# Patient Record
Sex: FEMALE | Race: WHITE | NOT HISPANIC OR LATINO | Employment: OTHER | URBAN - METROPOLITAN AREA
[De-identification: names, ages, dates, MRNs, and addresses within clinical notes are randomized per-mention and may not be internally consistent; named-entity substitution may affect disease eponyms.]

---

## 2023-05-17 DIAGNOSIS — Z00.00 HEALTH MAINTENANCE EXAMINATION: ICD-10-CM

## 2023-07-09 PROBLEM — I47.10 PSVT (PAROXYSMAL SUPRAVENTRICULAR TACHYCARDIA) (CMS-HCC): Status: ACTIVE | Noted: 2023-07-09

## 2023-07-09 PROBLEM — M54.30 SCIATICA: Status: RESOLVED | Noted: 2023-07-09 | Resolved: 2023-07-09

## 2023-07-09 PROBLEM — R73.02 IGT (IMPAIRED GLUCOSE TOLERANCE): Status: ACTIVE | Noted: 2023-07-09

## 2023-07-09 PROBLEM — I34.0 MITRAL REGURGITATION: Status: ACTIVE | Noted: 2023-07-09

## 2023-07-09 PROBLEM — E55.9 MILD VITAMIN D DEFICIENCY: Status: ACTIVE | Noted: 2023-07-09

## 2023-07-09 PROBLEM — K21.9 ESOPHAGEAL REFLUX: Status: ACTIVE | Noted: 2023-07-09

## 2023-07-09 PROBLEM — R31.9 HEMATURIA: Status: ACTIVE | Noted: 2023-07-09

## 2023-07-09 PROBLEM — I25.10 CORONARY ARTERY ARTERIOSCLEROSIS: Status: ACTIVE | Noted: 2023-07-09

## 2023-07-09 PROBLEM — E78.5 HYPERLIPIDEMIA: Status: ACTIVE | Noted: 2023-07-09

## 2023-07-09 PROBLEM — E21.3 HYPERPARATHYROIDISM (MULTI): Status: RESOLVED | Noted: 2023-07-09 | Resolved: 2023-07-09

## 2023-07-09 PROBLEM — G47.00 INSOMNIA: Status: ACTIVE | Noted: 2023-07-09

## 2023-07-09 PROBLEM — R09.89 BRUIT: Status: ACTIVE | Noted: 2023-07-09

## 2023-07-09 PROBLEM — J45.909 ASTHMA (HHS-HCC): Status: ACTIVE | Noted: 2023-07-09

## 2023-07-09 PROBLEM — M81.0 OSTEOPOROSIS: Status: ACTIVE | Noted: 2023-07-09

## 2023-07-09 PROBLEM — K59.09 CHRONIC CONSTIPATION: Status: ACTIVE | Noted: 2023-07-09

## 2023-07-09 PROBLEM — I10 HYPERTENSION: Status: ACTIVE | Noted: 2023-07-09

## 2023-07-09 RX ORDER — ROSUVASTATIN CALCIUM 20 MG/1
20 TABLET, COATED ORAL NIGHTLY
COMMUNITY
End: 2023-09-26 | Stop reason: SDUPTHER

## 2023-07-09 RX ORDER — BECLOMETHASONE DIPROPIONATE HFA 40 UG/1
AEROSOL, METERED RESPIRATORY (INHALATION)
COMMUNITY
Start: 2023-02-26 | End: 2023-09-19 | Stop reason: ALTCHOICE

## 2023-07-09 RX ORDER — MIRTAZAPINE 15 MG/1
15 TABLET, FILM COATED ORAL NIGHTLY
COMMUNITY
Start: 2023-04-02

## 2023-07-09 RX ORDER — ACETAMINOPHEN 500 MG
1 TABLET ORAL DAILY
COMMUNITY

## 2023-07-09 RX ORDER — METOPROLOL SUCCINATE 25 MG/1
25 TABLET, EXTENDED RELEASE ORAL DAILY
COMMUNITY
End: 2023-09-26 | Stop reason: SDUPTHER

## 2023-07-09 RX ORDER — DENOSUMAB 60 MG/ML
INJECTION SUBCUTANEOUS
COMMUNITY
Start: 2021-10-06

## 2023-07-09 RX ORDER — OLMESARTAN MEDOXOMIL 40 MG/1
40 TABLET ORAL DAILY
COMMUNITY
End: 2023-09-26 | Stop reason: SDUPTHER

## 2023-07-09 RX ORDER — AMLODIPINE BESYLATE 5 MG/1
5 TABLET ORAL DAILY
COMMUNITY
End: 2023-09-26 | Stop reason: SDUPTHER

## 2023-07-09 RX ORDER — EZETIMIBE 10 MG/1
10 TABLET ORAL DAILY
COMMUNITY
End: 2023-09-26 | Stop reason: SDUPTHER

## 2023-07-10 NOTE — PROGRESS NOTES
Valerie Owusu is a 82 y.o. female who presents New Patient Visit      HPI:   Valerie presents for a meet and greet an establish care visit.  She has a medical history for longstanding tachycardia since she has been 16 years old, an underdeveloped left breast with implants and approximately 11 years ago ended up in the intensive care unit after an implant replacement she got dehydrated with severe hyponatremia to 116.  She has preclinical coronary artery disease, HTN, dyslipidemia, osteoporosis, anxiety, And longstanding insomnia.  She does follow with psychiatry and has had different medications for sleep including mirtazapine and doxepin which made her tachycardia worse.  Currently she has tried over-the-counter CBD Gummies which has been very effective., .S/P parathyroidectomy, chronic microhematuria,     Stress testing 2018 normal stress echo, exercised to 11.5 mets, again no angina.     Microhematuria workup neg in the past w/CT urogram w/kidney cysts. Urine cytology neg, cystoscopy neg. Saw urology, neg investigation.     rheumatology, started her on Prolia for her osteoporosis hx.   Last bone density 2020, T score -2.5 in the femur. Treated w/bisphosphonate therapy prior.         Walks  a lot  800-25,000 steps a day.  High heat and humidity bothers her. Can get reactive airway and asthmatic bronchitis but not a true asthmatic.    No exercise intolerance.  Getting more frequent now.    Sometimes a flip flop, then she is fine.        Shx- to [facility] with 2 adult kids Dr. Micheal Owusu and Connie who is in town.  She has 5 biologic grandchildren and 2 grandchildren from Connie' second marriage as her 's children  She loves gardening.  Lives in .   Worked in special education. Good friends with Atul and Elif Arriaga.        Patient Active Problem List   Diagnosis    Asthma    Bruit    Chronic constipation    Coronary artery arteriosclerosis    Osteoporosis    Esophageal reflux    Hematuria    Hyperlipidemia     Hypertension    IGT (impaired glucose tolerance)    Insomnia    Mild vitamin D deficiency    Mitral regurgitation    PSVT (paroxysmal supraventricular tachycardia) (CMS/[facility])        Past Medical History:   Diagnosis Date    Age-related osteoporosis without current pathological fracture 10/06/2021    Osteoporosis    Hyperparathyroidism (CMS/HCC) 07/09/2023    Other disorders of intestinal carbohydrate absorption     Other disorders of intestinal carbohydrate absorption    Personal history of other diseases of the circulatory system     History of hypertension    Personal history of other diseases of the circulatory system 06/24/2013    History of paroxysmal supraventricular tachycardia    Personal history of other endocrine, nutritional and metabolic disease     History of hyperparathyroidism    Personal history of other endocrine, nutritional and metabolic disease     History of hyperlipidemia    Sciatica 07/09/2023    Vitamin D deficiency, unspecified     Vitamin D deficiency        Past Surgical History:   Procedure Laterality Date    OTHER SURGICAL HISTORY  10/06/2021    Breast Surgery Reconstruction    OTHER SURGICAL HISTORY  10/06/2021    Parathyroid Resection        Social History     Tobacco Use    Smoking status: Never    Smokeless tobacco: Never         Current Outpatient Medications:     amLODIPine (Norvasc) 5 mg tablet, Take 1 tablet (5 mg) by mouth once daily., Disp: , Rfl:     cholecalciferol (Vitamin D-3) 50 mcg (2,000 unit) capsule, Take 1 capsule (50 mcg) by mouth once daily., Disp: , Rfl:     denosumab (Prolia) 60 mg/mL syringe, Inject under the skin., Disp: , Rfl:     ezetimibe (Zetia) 10 mg tablet, Take 1 tablet (10 mg) by mouth once daily., Disp: , Rfl:     magnesium oxide (Mag-Ox) 400 mg tablet, Take 1.25 tablets (500 mg) by mouth 1 (one) time per week., Disp: 11 tablet, Rfl: 11    metoprolol succinate XL (Toprol-XL) 25 mg 24 hr tablet, Take 1 tablet (25 mg) by mouth once daily., Disp: , Rfl:  "    mirtazapine (Remeron) 15 mg tablet, Take 1 tablet (15 mg) by mouth once daily at bedtime., Disp: , Rfl:     olmesartan (BENIcar) 40 mg tablet, Take 1 tablet (40 mg) by mouth once daily., Disp: , Rfl:     Qvar RediHaler 40 mcg/actuation inhaler, INHALE 2 PUFFS INTO THE LUNGS TWICE DAILY, GARGLE WATER AFTER USE, Disp: , Rfl:     rosuvastatin (Crestor) 20 mg tablet, Take 1 tablet (20 mg) by mouth once daily at bedtime., Disp: , Rfl:     metoprolol tartrate (Lopressor) 25 mg tablet, Take 1/2 tablet prn tachycardia, Disp: 30 tablet, Rfl: 0     Allergies   Allergen Reactions    Hydrochlorothiazide Other       Review of Systems     /87 (BP Location: Left arm, Patient Position: Sitting)   Pulse (!) 123   Temp 36.4 °C (97.5 °F)   Ht 1.588 m (5' 2.5\")   Wt 61.3 kg (135 lb 4 oz)   SpO2 95%   BMI 24.34 kg/m²  Body mass index is 24.34 kg/m².     Physical Exam  Constitutional:       Appearance: Normal appearance.   Cardiovascular:      Rate and Rhythm: Normal rate and regular rhythm.   Pulmonary:      Effort: Pulmonary effort is normal.      Breath sounds: Normal breath sounds.   Musculoskeletal:         General: Normal range of motion.   Skin:     General: Skin is warm and dry.   Neurological:      General: No focal deficit present.      Mental Status: She is alert.   Psychiatric:         Mood and Affect: Mood normal.           Problem List Items Addressed This Visit       Coronary artery arteriosclerosis    Relevant Medications    amLODIPine (Norvasc) 5 mg tablet    metoprolol succinate XL (Toprol-XL) 25 mg 24 hr tablet    metoprolol tartrate (Lopressor) 25 mg tablet    Other Relevant Orders    Transthoracic Echo (TTE) Complete    PSVT (paroxysmal supraventricular tachycardia) (CMS/HCC)    Relevant Medications    amLODIPine (Norvasc) 5 mg tablet    metoprolol succinate XL (Toprol-XL) 25 mg 24 hr tablet    metoprolol tartrate (Lopressor) 25 mg tablet     Other Visit Diagnoses       Tachycardia    -  Primary    " Relevant Medications    metoprolol tartrate (Lopressor) 25 mg tablet    Other Relevant Orders    Transthoracic Echo (TTE) Complete    Holter or Event Cardiac Monitor             Assessment/Plan   Given worsening Tachycardia will update ECHO and obtain Holter.  Will start a short acting metoprolol as needed   She will return for a CPE with fasting labs in the near future       Heike Hodges MD

## 2023-07-11 ENCOUNTER — OFFICE VISIT (OUTPATIENT)
Dept: PRIMARY CARE | Facility: CLINIC | Age: 83
End: 2023-07-11
Payer: MEDICARE

## 2023-07-11 VITALS
SYSTOLIC BLOOD PRESSURE: 121 MMHG | WEIGHT: 135.25 LBS | BODY MASS INDEX: 23.96 KG/M2 | HEART RATE: 123 BPM | TEMPERATURE: 97.5 F | DIASTOLIC BLOOD PRESSURE: 87 MMHG | HEIGHT: 63 IN | OXYGEN SATURATION: 95 %

## 2023-07-11 DIAGNOSIS — I25.10 CORONARY ARTERY ARTERIOSCLEROSIS: ICD-10-CM

## 2023-07-11 DIAGNOSIS — I47.10 PSVT (PAROXYSMAL SUPRAVENTRICULAR TACHYCARDIA) (CMS-HCC): ICD-10-CM

## 2023-07-11 DIAGNOSIS — R00.0 TACHYCARDIA: Primary | ICD-10-CM

## 2023-07-11 DIAGNOSIS — R00.0 TACHYCARDIA: ICD-10-CM

## 2023-07-11 PROCEDURE — UHSMG PR UH SELECT MEET AND GREET: Performed by: INTERNAL MEDICINE

## 2023-07-11 PROCEDURE — 1160F RVW MEDS BY RX/DR IN RCRD: CPT | Performed by: INTERNAL MEDICINE

## 2023-07-11 PROCEDURE — 3074F SYST BP LT 130 MM HG: CPT | Performed by: INTERNAL MEDICINE

## 2023-07-11 PROCEDURE — 1126F AMNT PAIN NOTED NONE PRSNT: CPT | Performed by: INTERNAL MEDICINE

## 2023-07-11 PROCEDURE — 1036F TOBACCO NON-USER: CPT | Performed by: INTERNAL MEDICINE

## 2023-07-11 PROCEDURE — 3079F DIAST BP 80-89 MM HG: CPT | Performed by: INTERNAL MEDICINE

## 2023-07-11 PROCEDURE — 1159F MED LIST DOCD IN RCRD: CPT | Performed by: INTERNAL MEDICINE

## 2023-07-11 RX ORDER — CALCIUM CARBONATE 300MG(750)
520 TABLET,CHEWABLE ORAL
Qty: 11 TABLET | Refills: 11 | Status: SHIPPED
Start: 2023-07-11 | End: 2025-07-19

## 2023-07-11 RX ORDER — METOPROLOL TARTRATE 25 MG/1
TABLET, FILM COATED ORAL
Qty: 30 TABLET | Refills: 0 | Status: SHIPPED | OUTPATIENT
Start: 2023-07-11 | End: 2023-08-19

## 2023-07-11 RX ORDER — METOPROLOL TARTRATE 25 MG/1
TABLET, FILM COATED ORAL
Qty: 30 TABLET | Refills: 0 | Status: SHIPPED | OUTPATIENT
Start: 2023-07-11 | End: 2023-07-11 | Stop reason: SDUPTHER

## 2023-07-11 RX ORDER — CALCIUM CARBONATE 300MG(750)
400 TABLET,CHEWABLE ORAL DAILY
COMMUNITY
End: 2023-07-11 | Stop reason: DRUGHIGH

## 2023-08-03 DIAGNOSIS — R00.0 TACHYCARDIA: ICD-10-CM

## 2023-08-03 RX ORDER — METOPROLOL TARTRATE 25 MG/1
TABLET, FILM COATED ORAL
Qty: 30 TABLET | Refills: 0 | OUTPATIENT
Start: 2023-08-03

## 2023-08-19 DIAGNOSIS — R00.0 TACHYCARDIA: ICD-10-CM

## 2023-08-19 RX ORDER — METOPROLOL TARTRATE 25 MG/1
TABLET, FILM COATED ORAL
Qty: 45 TABLET | Refills: 3 | Status: SHIPPED | OUTPATIENT
Start: 2023-08-19 | End: 2023-09-26 | Stop reason: SDUPTHER

## 2023-08-19 RX ORDER — METOPROLOL TARTRATE 25 MG/1
TABLET, FILM COATED ORAL
Qty: 45 TABLET | Refills: 1 | Status: SHIPPED | OUTPATIENT
Start: 2023-08-19 | End: 2023-08-19 | Stop reason: SDUPTHER

## 2023-08-25 ENCOUNTER — TELEPHONE (OUTPATIENT)
Dept: PRIMARY CARE | Facility: CLINIC | Age: 83
End: 2023-08-25
Payer: MEDICARE

## 2023-08-25 DIAGNOSIS — Z71.84 TRAVEL ADVICE ENCOUNTER: Primary | ICD-10-CM

## 2023-08-25 RX ORDER — AZITHROMYCIN 500 MG/1
500 TABLET, FILM COATED ORAL DAILY
Qty: 5 TABLET | Refills: 0 | Status: SHIPPED | OUTPATIENT
Start: 2023-08-25 | End: 2023-08-30

## 2023-08-25 NOTE — TELEPHONE ENCOUNTER
Patient leaving 9/4 out of country to [facility] x 10 days.    She would like to take antibiotics with her for emergency.  Can you send in RX to [facility] in [place] [phone].    I can call her back on Monday 8/28.  [phone]

## 2023-08-25 NOTE — TELEPHONE ENCOUNTER
Sure.  Sent  Azithromycin in which is good for upper respiratory infections, travelers diarrhea and UTIs

## 2023-09-01 ENCOUNTER — LAB (OUTPATIENT)
Dept: LAB | Facility: LAB | Age: 83
End: 2023-09-01
Payer: MEDICARE

## 2023-09-01 DIAGNOSIS — Z00.00 HEALTH MAINTENANCE EXAMINATION: ICD-10-CM

## 2023-09-01 LAB
ALANINE AMINOTRANSFERASE (SGPT) (U/L) IN SER/PLAS: 19 U/L (ref 7–45)
ALBUMIN (G/DL) IN SER/PLAS: 4.4 G/DL (ref 3.4–5)
ALKALINE PHOSPHATASE (U/L) IN SER/PLAS: 44 U/L (ref 33–136)
ANION GAP IN SER/PLAS: 15 MMOL/L (ref 10–20)
ASPARTATE AMINOTRANSFERASE (SGOT) (U/L) IN SER/PLAS: 19 U/L (ref 9–39)
BASOPHILS (10*3/UL) IN BLOOD BY AUTOMATED COUNT: 0.08 X10E9/L (ref 0–0.1)
BASOPHILS/100 LEUKOCYTES IN BLOOD BY AUTOMATED COUNT: 1.6 % (ref 0–2)
BILIRUBIN TOTAL (MG/DL) IN SER/PLAS: 0.6 MG/DL (ref 0–1.2)
C REACTIVE PROTEIN (MG/L) IN SER/PLAS BY HIGH SENSIT: 0.3 MG/L
CALCIDIOL (25 OH VITAMIN D3) (NG/ML) IN SER/PLAS: 37 NG/ML
CALCIUM (MG/DL) IN SER/PLAS: 9.9 MG/DL (ref 8.6–10.6)
CARBON DIOXIDE, TOTAL (MMOL/L) IN SER/PLAS: 28 MMOL/L (ref 21–32)
CHLORIDE (MMOL/L) IN SER/PLAS: 102 MMOL/L (ref 98–107)
CHOLESTEROL (MG/DL) IN SER/PLAS: 188 MG/DL (ref 0–199)
CHOLESTEROL IN HDL (MG/DL) IN SER/PLAS: 85.7 MG/DL
CHOLESTEROL/HDL RATIO: 2.2
CREATININE (MG/DL) IN SER/PLAS: 0.7 MG/DL (ref 0.5–1.05)
EOSINOPHILS (10*3/UL) IN BLOOD BY AUTOMATED COUNT: 0.05 X10E9/L (ref 0–0.4)
EOSINOPHILS/100 LEUKOCYTES IN BLOOD BY AUTOMATED COUNT: 1 % (ref 0–6)
ERYTHROCYTE DISTRIBUTION WIDTH (RATIO) BY AUTOMATED COUNT: 13.4 % (ref 11.5–14.5)
ERYTHROCYTE MEAN CORPUSCULAR HEMOGLOBIN CONCENTRATION (G/DL) BY AUTOMATED: 31.5 G/DL (ref 32–36)
ERYTHROCYTE MEAN CORPUSCULAR VOLUME (FL) BY AUTOMATED COUNT: 89 FL (ref 80–100)
ERYTHROCYTES (10*6/UL) IN BLOOD BY AUTOMATED COUNT: 4.7 X10E12/L (ref 4–5.2)
ESTIMATED AVERAGE GLUCOSE FOR HBA1C: 114 MG/DL
GFR FEMALE: 87 ML/MIN/1.73M2
GLUCOSE (MG/DL) IN SER/PLAS: 105 MG/DL (ref 74–99)
HEMATOCRIT (%) IN BLOOD BY AUTOMATED COUNT: 41.6 % (ref 36–46)
HEMOGLOBIN (G/DL) IN BLOOD: 13.1 G/DL (ref 12–16)
HEMOGLOBIN A1C/HEMOGLOBIN TOTAL IN BLOOD: 5.6 %
IMMATURE GRANULOCYTES/100 LEUKOCYTES IN BLOOD BY AUTOMATED COUNT: 0.4 % (ref 0–0.9)
LDL: 84 MG/DL (ref 0–99)
LEUKOCYTES (10*3/UL) IN BLOOD BY AUTOMATED COUNT: 5.1 X10E9/L (ref 4.4–11.3)
LYMPHOCYTES (10*3/UL) IN BLOOD BY AUTOMATED COUNT: 1.16 X10E9/L (ref 0.8–3)
LYMPHOCYTES/100 LEUKOCYTES IN BLOOD BY AUTOMATED COUNT: 22.9 % (ref 13–44)
MONOCYTES (10*3/UL) IN BLOOD BY AUTOMATED COUNT: 0.31 X10E9/L (ref 0.05–0.8)
MONOCYTES/100 LEUKOCYTES IN BLOOD BY AUTOMATED COUNT: 6.1 % (ref 2–10)
NEUTROPHILS (10*3/UL) IN BLOOD BY AUTOMATED COUNT: 3.45 X10E9/L (ref 1.6–5.5)
NEUTROPHILS/100 LEUKOCYTES IN BLOOD BY AUTOMATED COUNT: 68 % (ref 40–80)
NRBC (PER 100 WBCS) BY AUTOMATED COUNT: 0 /100 WBC (ref 0–0)
PLATELETS (10*3/UL) IN BLOOD AUTOMATED COUNT: 231 X10E9/L (ref 150–450)
POTASSIUM (MMOL/L) IN SER/PLAS: 4.5 MMOL/L (ref 3.5–5.3)
PROTEIN TOTAL: 6.8 G/DL (ref 6.4–8.2)
RBC, URINE: 5 /HPF (ref 0–5)
SODIUM (MMOL/L) IN SER/PLAS: 140 MMOL/L (ref 136–145)
SQUAMOUS EPITHELIAL CELLS, URINE: <1 /HPF
THYROTROPIN (MIU/L) IN SER/PLAS BY DETECTION LIMIT <= 0.05 MIU/L: 1.15 MIU/L (ref 0.44–3.98)
TRIGLYCERIDE (MG/DL) IN SER/PLAS: 91 MG/DL (ref 0–149)
UREA NITROGEN (MG/DL) IN SER/PLAS: 19 MG/DL (ref 6–23)
VLDL: 18 MG/DL (ref 0–40)
WBC, URINE: <1 /HPF (ref 0–5)

## 2023-09-01 PROCEDURE — 80053 COMPREHEN METABOLIC PANEL: CPT

## 2023-09-01 PROCEDURE — 85025 COMPLETE CBC W/AUTO DIFF WBC: CPT

## 2023-09-01 PROCEDURE — 36415 COLL VENOUS BLD VENIPUNCTURE: CPT

## 2023-09-01 PROCEDURE — 86141 C-REACTIVE PROTEIN HS: CPT

## 2023-09-01 PROCEDURE — 83036 HEMOGLOBIN GLYCOSYLATED A1C: CPT

## 2023-09-01 PROCEDURE — 81001 URINALYSIS AUTO W/SCOPE: CPT

## 2023-09-01 PROCEDURE — 80061 LIPID PANEL: CPT

## 2023-09-01 PROCEDURE — 84443 ASSAY THYROID STIM HORMONE: CPT

## 2023-09-01 PROCEDURE — 82306 VITAMIN D 25 HYDROXY: CPT

## 2023-09-18 PROBLEM — M48.061 SPINAL STENOSIS OF LUMBAR REGION: Status: RESOLVED | Noted: 2019-12-31 | Resolved: 2023-09-18

## 2023-09-18 PROBLEM — E55.9 MILD VITAMIN D DEFICIENCY: Status: RESOLVED | Noted: 2023-07-09 | Resolved: 2023-09-18

## 2023-09-18 PROBLEM — H43.811 POSTERIOR VITREOUS DETACHMENT OF RIGHT EYE: Status: RESOLVED | Noted: 2018-07-18 | Resolved: 2023-09-18

## 2023-09-18 PROBLEM — L71.9 ROSACEA, UNSPECIFIED: Status: RESOLVED | Noted: 2018-07-25 | Resolved: 2023-09-18

## 2023-09-18 PROBLEM — I49.3 FREQUENT PVCS: Status: ACTIVE | Noted: 2023-09-18

## 2023-09-18 PROBLEM — M48.061 SPINAL STENOSIS OF LUMBAR REGION: Status: ACTIVE | Noted: 2019-12-31

## 2023-09-18 PROBLEM — M16.9 OSTEOARTHRITIS OF HIP: Status: RESOLVED | Noted: 2019-12-31 | Resolved: 2023-09-18

## 2023-09-18 PROBLEM — I34.0 MITRAL REGURGITATION: Status: RESOLVED | Noted: 2023-07-09 | Resolved: 2023-09-18

## 2023-09-18 PROBLEM — Z00.00 ANNUAL PHYSICAL EXAM: Status: ACTIVE | Noted: 2023-09-18

## 2023-09-18 PROBLEM — J45.909 ASTHMA (HHS-HCC): Status: RESOLVED | Noted: 2023-07-09 | Resolved: 2023-09-18

## 2023-09-18 PROBLEM — R31.9 HEMATURIA: Status: RESOLVED | Noted: 2023-07-09 | Resolved: 2023-09-18

## 2023-09-18 PROBLEM — K21.9 ESOPHAGEAL REFLUX: Status: RESOLVED | Noted: 2023-07-09 | Resolved: 2023-09-18

## 2023-09-18 PROBLEM — E78.00 PURE HYPERCHOLESTEROLEMIA: Status: ACTIVE | Noted: 2023-09-18

## 2023-09-18 PROBLEM — J30.9 ALLERGIC RHINITIS: Status: ACTIVE | Noted: 2020-05-04

## 2023-09-18 PROBLEM — R09.89 BRUIT: Status: RESOLVED | Noted: 2023-07-09 | Resolved: 2023-09-18

## 2023-09-18 PROBLEM — I47.10 PSVT (PAROXYSMAL SUPRAVENTRICULAR TACHYCARDIA) (CMS-HCC): Status: RESOLVED | Noted: 2023-07-09 | Resolved: 2023-09-18

## 2023-09-18 PROBLEM — L71.9 ROSACEA, UNSPECIFIED: Status: ACTIVE | Noted: 2018-07-25

## 2023-09-18 RX ORDER — ALPRAZOLAM 0.5 MG/1
0.5 TABLET ORAL DAILY PRN
COMMUNITY
Start: 2023-08-02

## 2023-09-18 RX ORDER — ASPIRIN 81 MG/1
1 TABLET ORAL DAILY
COMMUNITY

## 2023-09-18 ASSESSMENT — PROMIS GLOBAL HEALTH SCALE
RATE_PHYSICAL_HEALTH: VERY GOOD
RATE_MENTAL_HEALTH: VERY GOOD
RATE_SOCIAL_SATISFACTION: EXCELLENT
RATE_GENERAL_HEALTH: VERY GOOD
CARRYOUT_SOCIAL_ACTIVITIES: EXCELLENT
EMOTIONAL_PROBLEMS: RARELY
RATE_AVERAGE_PAIN: 0
CARRYOUT_PHYSICAL_ACTIVITIES: COMPLETELY
RATE_QUALITY_OF_LIFE: EXCELLENT
RATE_AVERAGE_FATIGUE: MILD

## 2023-09-18 NOTE — PROGRESS NOTES
Physical Exam    Name Valerie Owusu    Date of Service :9/19/2023    Valerie Owusu is a 83 y.o. year old female who is being seen for a comprehensive exam    Just got back from [place] had a great vacation.  She feels well overall.     Her history includes, Asthmatic bronchitis, Walks  a lot  800-25,000 steps a day.  High heat and humidity bothers her with this as well as increasing her tachycardia . (not a true asthmatic per patient) ,  HTN, dyslipidemia,preclinical coronary artery disease with elevated  , , hx of PSVT since age 16 ( Holter done in July 2023, no afib 12 % SVE beats and ECHO done 7/20/23 CONCLUSIONS: 1. Left ventricular systolic function is normal with a 60-65% estimated ejection fraction. 2. Spectral Doppler shows an impaired relaxation pattern of left ventricular diastolic filling. 3. There is moderate mitral annular calcification. 4. Left ventricular cavity size is decreased ), osteoporosis ( on Prolia) ,  She’s had parathyroidectomy, anxiety, longstanding insomnia, (She does follow with psychiatry and has had different medications for sleep including mirtazapine and doxepin which made her tachycardia worse.  Currently she has been using  over-the-counter CBD Gummies which has been very effective)  For her trip, her psychiatrist gave her 12 xanax which she only used a few times.     She has an underdeveloped left breast with silicone implants that are now encapsulated and protruding.  These are big sources of her health care concerns.  She wished she had had them removed.  Approximately 11 years ago, she ended up in the intensive care unit after an implant replacement she got dehydrated from vomiting with severe hyponatremia to 116.       Her other main health issue ( other than the tachycardia and breast implants )is her chronic insomnia.  This has always  been an issue. She does follow with psychiatry  Dr. Selin Valencia  [place] and tends to be anxious but not depressed.   She has  tried many different sleep aides.   Now takes CBC gummie .03 thc which helps.   Mirtazapine not helpful but she does take it.  Doxepin gave her tachycardia and she wishes to avoid this .     Patient Active Problem List   Diagnosis    Chronic constipation    Coronary artery arteriosclerosis    Osteoporosis    Hyperlipidemia    Hypertension    IGT (impaired glucose tolerance)    Insomnia    Allergic rhinitis    Pure hypercholesterolemia    Annual physical exam    Frequent PVCs        Past Medical History:   Diagnosis Date    Asthmatic bronchitis     Hyperparathyroidism (CMS/HCC)     Other disorders of intestinal carbohydrate absorption     Posterior vitreous detachment of right eye     PSVT (paroxysmal supraventricular tachycardia) (CMS/HCC)     Rosacea, unspecified     Sciatica     Spinal stenosis of lumbar region         Past Surgical History:   Procedure Laterality Date    BREAST SURGERY      Breast Surgery Reconstruction    PARATHYROIDECTOMY          Social History     Tobacco Use    Smoking status: Never    Smokeless tobacco: Never      Social History     Social History Narrative     to [facility] with 2 adult kids Dr. Micheal Owusu and Connie who are in town.  She has 5 biologic grandchildren and 2 grandchildren from Connie's second marriage as her 's children    She loves gardening.  [facility]   Worked in special education.    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.    Grew up in [place].     Alcohol: rarely. It gives her tachycardia   Tobacco: non smoker.   Diet: balanced and healthy.    Exercise: walks a lot     No family history on file.   FAMILY HISTORY:   Mom: - battered by her second .  Asthmatic , ephapsima. from pleurisy. Cva, chf, d. 70  Dad:  age 99.  Healthy   dementia at 95, cad.   Siblings: 1 biologic sister ( Mackenzie)- 4 years younger.  phD psychologist. Asthma, depression ( was sexually abused) , obesity.   1/2 sister- committed suicide at 40 , alcoholic   Mgm: lived to  100  Mgf:  age 62 heart issues   Pgm:  of cancer.  Had breast cancer  at age 50's   Pgf:  42 stomach cancer   Paternal aunt-  in her 40's lymphoma   Children:   Micheal- multiple myeloma , had stem cell transplant 3 years ago.   Connie- had birth trauma from twins with hernias       Current Outpatient Medications:     ALPRAZolam (Xanax) 0.5 mg tablet, Take 1 tablet (0.5 mg) by mouth once daily as needed., Disp: , Rfl:     amLODIPine (Norvasc) 5 mg tablet, Take 1 tablet (5 mg) by mouth once daily., Disp: , Rfl:     aspirin 81 mg EC tablet, Take 1 tablet (81 mg) by mouth once daily., Disp: , Rfl:     cholecalciferol (Vitamin D-3) 50 mcg (2,000 unit) capsule, Take 1 capsule (50 mcg) by mouth once daily., Disp: , Rfl:     denosumab (Prolia) 60 mg/mL syringe, Inject under the skin., Disp: , Rfl:     ezetimibe (Zetia) 10 mg tablet, Take 1 tablet (10 mg) by mouth once daily., Disp: , Rfl:     magnesium oxide (Mag-Ox) 400 mg tablet, Take 1.25 tablets (500 mg) by mouth 1 (one) time per week., Disp: 11 tablet, Rfl: 11    metoprolol succinate XL (Toprol-XL) 25 mg 24 hr tablet, Take 1 tablet (25 mg) by mouth once daily., Disp: , Rfl:     metoprolol tartrate (Lopressor) 25 mg tablet, TAKE 1/2 TABLET BY MOUTH AS NEEDED FOR TACHYCARDIA, Disp: 45 tablet, Rfl: 3    mirtazapine (Remeron) 15 mg tablet, Take 1 tablet (15 mg) by mouth once daily at bedtime., Disp: , Rfl:     olmesartan (BENIcar) 40 mg tablet, Take 1 tablet (40 mg) by mouth once daily., Disp: , Rfl:     rosuvastatin (Crestor) 20 mg tablet, Take 1 tablet (20 mg) by mouth once daily at bedtime., Disp: , Rfl:     Allergies   Allergen Reactions    Codeine Unknown    Hydrochlorothiazide Other    Doxepin Palpitations       Review of Systems   Essential tremor.  Worse in left hand ( she is left handed)   No arthritis. Has some in her hands that don't bother her  Frequent Tachycardia  No PMB  No abdominal pan     /60   Pulse 77   Temp 36.9 °C (98.4 °F)   Ht  "1.582 m (5' 2.3\")   Wt 60.5 kg (133 lb 6 oz)   SpO2 97%   BMI 24.16 kg/m²  Body mass index is 24.16 kg/m².    Physical Exam  Vitals reviewed.   Constitutional:       General: She is not in acute distress.     Appearance: Normal appearance.   HENT:      Right Ear: Tympanic membrane and external ear normal.      Left Ear: Tympanic membrane and external ear normal.      Mouth/Throat:      Mouth: Mucous membranes are moist.      Pharynx: No oropharyngeal exudate or posterior oropharyngeal erythema.   Eyes:      Extraocular Movements: Extraocular movements intact.      Pupils: Pupils are equal, round, and reactive to light.   Neck:      Vascular: No carotid bruit.   Cardiovascular:      Rate and Rhythm: Normal rate and regular rhythm.      Pulses: Normal pulses.      Heart sounds: Normal heart sounds. No murmur heard.     No gallop.   Pulmonary:      Effort: Pulmonary effort is normal. No respiratory distress.      Breath sounds: Normal breath sounds. No wheezing, rhonchi or rales.   Chest:      Comments: Silicone breast implants noted   No adenopathy noted  Abdominal:      General: Bowel sounds are normal. There is no distension.      Palpations: There is no mass.      Tenderness: There is no abdominal tenderness. There is no guarding.   Musculoskeletal:         General: No swelling or tenderness. Normal range of motion.      Right lower leg: No edema.      Left lower leg: No edema.   Lymphadenopathy:      Cervical: No cervical adenopathy.      Upper Body:      Right upper body: No supraclavicular or axillary adenopathy.      Left upper body: No supraclavicular or axillary adenopathy.      Lower Body: No right inguinal adenopathy. No left inguinal adenopathy.   Skin:     General: Skin is warm and dry.      Findings: No rash.   Neurological:      General: No focal deficit present.      Mental Status: She is alert.   Psychiatric:         Mood and Affect: Mood normal.         RESULTS/DATA:  Reviewed Standard Labs for " this physical with patient ( any significant issues addressed in A/P )     ECG:  ectopic atrial rhythm. Normal ecg  .       Assessment/Plan   Overall doing well.    Will updateher  breast ultrasound and obtain a  breast MRI for implant integrety and suggest Plastic surgery consult.      For her tachycardia avoid triggers and she will continue to use her Metoprolol tartrate as needed in addition to the Metoprolol succinate    Routine: Flu vaccine and Covid booster recommended this fall  Increase her vitamin D to 4000 units daily       Problem List Items Addressed This Visit       Annual physical exam - Primary    Relevant Orders    ECG 12 lead (Clinic Performed) (Completed)     Other Visit Diagnoses       Encounter for breast cancer screening using non-mammogram modality        Relevant Orders    BI US breast complete bilateral    Extrusion of breast implant, initial encounter        Relevant Orders    MR breast blateral wo IV contrast for implant integrity    Capsular contracture of breast implant, initial encounter        Relevant Orders    BI US breast complete bilateral            ROUTINE:     Immunizations should get flu and covid this year     Mammogram: last completed  does not wish to have anymore?   GYN EXAM:  remote.  No pmb.  Menopause at 58.    COLONOSCOPY: 9/21/09  does not wish to have anymore?   DEXA: 8/8/22  lowest T score -3.7 in forearm, -1.7 in left femur.  Last Prolia April 2023    OPHTHALMOLOGY: current   DERMATOLOGY current . Dr. Navarrete  DENTISTRY: current.       Heike Hodges MD

## 2023-09-19 ENCOUNTER — OFFICE VISIT (OUTPATIENT)
Dept: PRIMARY CARE | Facility: CLINIC | Age: 83
End: 2023-09-19
Payer: MEDICARE

## 2023-09-19 VITALS
TEMPERATURE: 98.4 F | BODY MASS INDEX: 24.54 KG/M2 | DIASTOLIC BLOOD PRESSURE: 60 MMHG | SYSTOLIC BLOOD PRESSURE: 118 MMHG | HEIGHT: 62 IN | HEART RATE: 77 BPM | WEIGHT: 133.38 LBS | OXYGEN SATURATION: 97 %

## 2023-09-19 DIAGNOSIS — T85.44XA CAPSULAR CONTRACTURE OF BREAST IMPLANT, INITIAL ENCOUNTER: ICD-10-CM

## 2023-09-19 DIAGNOSIS — T85.49XA EXTRUSION OF BREAST IMPLANT, INITIAL ENCOUNTER: ICD-10-CM

## 2023-09-19 DIAGNOSIS — Z12.39 ENCOUNTER FOR BREAST CANCER SCREENING USING NON-MAMMOGRAM MODALITY: ICD-10-CM

## 2023-09-19 DIAGNOSIS — Z00.00 ANNUAL PHYSICAL EXAM: Primary | ICD-10-CM

## 2023-09-19 PROCEDURE — 1126F AMNT PAIN NOTED NONE PRSNT: CPT | Performed by: INTERNAL MEDICINE

## 2023-09-19 PROCEDURE — 1036F TOBACCO NON-USER: CPT | Performed by: INTERNAL MEDICINE

## 2023-09-19 PROCEDURE — 3078F DIAST BP <80 MM HG: CPT | Performed by: INTERNAL MEDICINE

## 2023-09-19 PROCEDURE — 1160F RVW MEDS BY RX/DR IN RCRD: CPT | Performed by: INTERNAL MEDICINE

## 2023-09-19 PROCEDURE — 93000 ELECTROCARDIOGRAM COMPLETE: CPT | Performed by: INTERNAL MEDICINE

## 2023-09-19 PROCEDURE — 3074F SYST BP LT 130 MM HG: CPT | Performed by: INTERNAL MEDICINE

## 2023-09-19 PROCEDURE — 1159F MED LIST DOCD IN RCRD: CPT | Performed by: INTERNAL MEDICINE

## 2023-09-19 PROCEDURE — UHSPHYS PR UH SELECT PHYSICAL: Performed by: INTERNAL MEDICINE

## 2023-09-19 NOTE — PATIENT INSTRUCTIONS
Will update your breast ultrasound. Order placed.   I also ordered a breast MRI for implant integrety and suggest you have a conversation with Plastic surgery about pros and cons of doing anything sooner rather than later    For your tachycardia avoid triggers and continue to use your Metoprolol tartrate as needed in addition to the Metoprolol succinate    Be sure to get the Flu vaccine and Covid booster this fall  Increase your vitamin D to 4000 units daily

## 2023-09-26 DIAGNOSIS — R00.0 TACHYCARDIA: ICD-10-CM

## 2023-09-26 RX ORDER — METOPROLOL SUCCINATE 25 MG/1
25 TABLET, EXTENDED RELEASE ORAL DAILY
Qty: 90 TABLET | Refills: 3 | Status: SHIPPED | OUTPATIENT
Start: 2023-09-26

## 2023-09-26 RX ORDER — ROSUVASTATIN CALCIUM 20 MG/1
20 TABLET, COATED ORAL NIGHTLY
Qty: 90 TABLET | Refills: 3 | Status: SHIPPED | OUTPATIENT
Start: 2023-09-26

## 2023-09-26 RX ORDER — OLMESARTAN MEDOXOMIL 40 MG/1
40 TABLET ORAL DAILY
Qty: 90 TABLET | Refills: 3 | Status: SHIPPED | OUTPATIENT
Start: 2023-09-26

## 2023-09-26 RX ORDER — METOPROLOL TARTRATE 25 MG/1
TABLET, FILM COATED ORAL
Qty: 45 TABLET | Refills: 3 | Status: SHIPPED | OUTPATIENT
Start: 2023-09-26 | End: 2024-02-15 | Stop reason: SDUPTHER

## 2023-09-26 RX ORDER — AMLODIPINE BESYLATE 5 MG/1
5 TABLET ORAL DAILY
Qty: 90 TABLET | Refills: 3 | Status: SHIPPED | OUTPATIENT
Start: 2023-09-26

## 2023-09-26 RX ORDER — EZETIMIBE 10 MG/1
10 TABLET ORAL DAILY
Qty: 90 TABLET | Refills: 3 | Status: SHIPPED | OUTPATIENT
Start: 2023-09-26 | End: 2024-05-20 | Stop reason: SDUPTHER

## 2023-10-04 ENCOUNTER — HOSPITAL ENCOUNTER (OUTPATIENT)
Dept: RADIOLOGY | Facility: HOSPITAL | Age: 83
Discharge: HOME | End: 2023-10-04
Payer: MEDICARE

## 2023-10-04 ENCOUNTER — APPOINTMENT (OUTPATIENT)
Dept: RADIOLOGY | Facility: HOSPITAL | Age: 83
End: 2023-10-04
Payer: MEDICARE

## 2023-10-04 ENCOUNTER — ANCILLARY PROCEDURE (OUTPATIENT)
Dept: RADIOLOGY | Facility: CLINIC | Age: 83
End: 2023-10-04
Payer: MEDICARE

## 2023-10-04 DIAGNOSIS — Z12.39 ENCOUNTER FOR BREAST CANCER SCREENING USING NON-MAMMOGRAM MODALITY: ICD-10-CM

## 2023-10-04 DIAGNOSIS — T85.49XA EXTRUSION OF BREAST IMPLANT, INITIAL ENCOUNTER: ICD-10-CM

## 2023-10-04 DIAGNOSIS — T85.44XA CAPSULAR CONTRACTURE OF BREAST IMPLANT, INITIAL ENCOUNTER: ICD-10-CM

## 2023-10-04 PROCEDURE — 76641 ULTRASOUND BREAST COMPLETE: CPT | Mod: BILATERAL PROCEDURE | Performed by: RADIOLOGY

## 2023-10-04 PROCEDURE — 77047 MRI BREAST C- BILATERAL: CPT | Mod: BILATERAL PROCEDURE | Performed by: STUDENT IN AN ORGANIZED HEALTH CARE EDUCATION/TRAINING PROGRAM

## 2023-10-04 PROCEDURE — 77047 MRI BREAST C- BILATERAL: CPT | Mod: 50

## 2023-10-04 PROCEDURE — 76641 ULTRASOUND BREAST COMPLETE: CPT | Mod: 50

## 2023-12-20 ENCOUNTER — TELEPHONE (OUTPATIENT)
Dept: PRIMARY CARE | Facility: CLINIC | Age: 83
End: 2023-12-20
Payer: MEDICARE

## 2023-12-20 NOTE — TELEPHONE ENCOUNTER
Patient had to come back to [place] early due to spouse was diagnose with advance melanoma behind knee.    She would like to discuss with you as her BP has been all over.    Please call  [phone]

## 2023-12-20 NOTE — TELEPHONE ENCOUNTER
Valerie has been undergoing increased stress secondary to the newly diagnosis of melanoma with her .  Her psychiatrist increased her mirtazapine to 30 mg and gave her 0.5 mg of Xanax to take as needed.  On average her blood pressure has been no higher than 140 with a normal diastolic but usually it is 130/76 with a pulse of 84.  Yesterday she had an extremely stressful day and had a very labile blood pressure and pulse.  It ranged anywhere from 151/120 with a pulse of 67 to 94/78 with a pulse of 140.  She was asymptomatic with both of these readings  This morning her blood pressure was 128/76 with a pulse of 66.  She has known tachycardia and has been taking a metoprolol tartrate 25 mg every morning in addition to the metoprolol succinate 25 mg in the evening.  She also continues on her amlodipine and her olmesartan.  We discussed that we will not make any changes based on yesterday's readings since it seemed to be a 1 off.  I did like her to continue with the metoprolol tartrate 25 mg every morning and for pulse greater than 100 that symptomatic she can take an extra 25 mg tartrate if needed.

## 2024-02-15 DIAGNOSIS — R00.0 TACHYCARDIA: ICD-10-CM

## 2024-02-15 RX ORDER — METOPROLOL TARTRATE 25 MG/1
TABLET, FILM COATED ORAL
Qty: 45 TABLET | Refills: 3 | Status: SHIPPED | OUTPATIENT
Start: 2024-02-15 | End: 2024-03-05 | Stop reason: SDUPTHER

## 2024-02-15 NOTE — TELEPHONE ENCOUNTER
Patient is taking Metoprolol tartrate 25 mg 1 tab at bedtime.    Can you send new RX to [facility] [place]

## 2024-02-16 DIAGNOSIS — M81.0 OSTEOPOROSIS, UNSPECIFIED OSTEOPOROSIS TYPE, UNSPECIFIED PATHOLOGICAL FRACTURE PRESENCE: ICD-10-CM

## 2024-03-05 DIAGNOSIS — R00.0 TACHYCARDIA: ICD-10-CM

## 2024-03-05 RX ORDER — METOPROLOL TARTRATE 25 MG/1
25 TABLET, FILM COATED ORAL DAILY
Qty: 90 TABLET | Refills: 3 | Status: SHIPPED | OUTPATIENT
Start: 2024-03-05 | End: 2025-03-05

## 2024-03-05 RX ORDER — METOPROLOL TARTRATE 25 MG/1
TABLET, FILM COATED ORAL
Qty: 45 TABLET | Refills: 3 | Status: SHIPPED | OUTPATIENT
Start: 2024-03-05 | End: 2024-03-05 | Stop reason: SDUPTHER

## 2024-03-05 NOTE — TELEPHONE ENCOUNTER
Patient requesting refill on Metoprolol tartrate 25mg.   She started taking 1 tab daily.    Can you send in new Rx?

## 2024-05-08 DIAGNOSIS — Z00.00 HEALTHCARE MAINTENANCE: ICD-10-CM

## 2024-05-20 DIAGNOSIS — R00.0 TACHYCARDIA: ICD-10-CM

## 2024-05-20 RX ORDER — EZETIMIBE 10 MG/1
10 TABLET ORAL DAILY
Qty: 90 TABLET | Refills: 3 | Status: SHIPPED | OUTPATIENT
Start: 2024-05-20

## 2024-08-12 ENCOUNTER — HOSPITAL ENCOUNTER (OUTPATIENT)
Dept: RADIOLOGY | Facility: CLINIC | Age: 84
Discharge: HOME | End: 2024-08-12
Payer: MEDICARE

## 2024-08-12 DIAGNOSIS — M81.0 OSTEOPOROSIS, UNSPECIFIED OSTEOPOROSIS TYPE, UNSPECIFIED PATHOLOGICAL FRACTURE PRESENCE: ICD-10-CM

## 2024-08-12 PROCEDURE — 77080 DXA BONE DENSITY AXIAL: CPT

## 2024-08-12 ASSESSMENT — LIFESTYLE VARIABLES
3_OR_MORE_DRINKS_PER_DAY: N
CURRENT_SMOKER: N

## 2024-09-09 ENCOUNTER — SPECIALTY PHARMACY (OUTPATIENT)
Dept: PHARMACY | Facility: CLINIC | Age: 84
End: 2024-09-09

## 2024-09-10 ENCOUNTER — TELEPHONE (OUTPATIENT)
Dept: RHEUMATOLOGY | Facility: CLINIC | Age: 84
End: 2024-09-10
Payer: MEDICARE

## 2024-09-10 NOTE — TELEPHONE ENCOUNTER
Call placed to patient to make aware of plan for anticipated Prolia injection. This nurse was unable to establish direct contact and voicemail left. This nurse communicated that she will be seen on 9/25 by Dr. Baker and will have an updated therapy plan at that time for Prolia. This nurse also communicated that she will not be receiving her Prolia on 9/25 in [place] during her apt and that she will need to go to the [facility] in October to avoid high copay due to insurance. Pt appointment date confirmed prior to ending call and she was encouraged to call/ message with any questions should they arise.

## 2024-09-11 ENCOUNTER — LAB (OUTPATIENT)
Dept: LAB | Facility: LAB | Age: 84
End: 2024-09-11
Payer: MEDICARE

## 2024-09-11 DIAGNOSIS — Z00.00 HEALTHCARE MAINTENANCE: ICD-10-CM

## 2024-09-11 LAB
25(OH)D3 SERPL-MCNC: 29 NG/ML (ref 30–100)
ALBUMIN SERPL BCP-MCNC: 4.2 G/DL (ref 3.4–5)
ALP SERPL-CCNC: 49 U/L (ref 33–136)
ALT SERPL W P-5'-P-CCNC: 20 U/L (ref 7–45)
ANION GAP SERPL CALC-SCNC: 13 MMOL/L (ref 10–20)
APPEARANCE UR: CLEAR
AST SERPL W P-5'-P-CCNC: 15 U/L (ref 9–39)
BASOPHILS # BLD AUTO: 0.04 X10*3/UL (ref 0–0.1)
BASOPHILS NFR BLD AUTO: 1.1 %
BILIRUB SERPL-MCNC: 0.5 MG/DL (ref 0–1.2)
BILIRUB UR STRIP.AUTO-MCNC: NEGATIVE MG/DL
BUN SERPL-MCNC: 10 MG/DL (ref 6–23)
CALCIUM SERPL-MCNC: 9.2 MG/DL (ref 8.6–10.6)
CHLORIDE SERPL-SCNC: 105 MMOL/L (ref 98–107)
CHOLEST SERPL-MCNC: 184 MG/DL (ref 0–199)
CHOLESTEROL/HDL RATIO: 2.4
CO2 SERPL-SCNC: 27 MMOL/L (ref 21–32)
COLOR UR: ABNORMAL
CREAT SERPL-MCNC: 0.58 MG/DL (ref 0.5–1.05)
CRP SERPL HS-MCNC: 4.5 MG/L
EGFRCR SERPLBLD CKD-EPI 2021: 90 ML/MIN/1.73M*2
EOSINOPHIL # BLD AUTO: 0.07 X10*3/UL (ref 0–0.4)
EOSINOPHIL NFR BLD AUTO: 1.9 %
ERYTHROCYTE [DISTWIDTH] IN BLOOD BY AUTOMATED COUNT: 13.8 % (ref 11.5–14.5)
EST. AVERAGE GLUCOSE BLD GHB EST-MCNC: 123 MG/DL
GLUCOSE SERPL-MCNC: 113 MG/DL (ref 74–99)
GLUCOSE UR STRIP.AUTO-MCNC: NORMAL MG/DL
HBA1C MFR BLD: 5.9 %
HCT VFR BLD AUTO: 37.8 % (ref 36–46)
HDLC SERPL-MCNC: 78 MG/DL
HGB BLD-MCNC: 12 G/DL (ref 12–16)
HOLD SPECIMEN: NORMAL
IMM GRANULOCYTES # BLD AUTO: 0.01 X10*3/UL (ref 0–0.5)
IMM GRANULOCYTES NFR BLD AUTO: 0.3 % (ref 0–0.9)
KETONES UR STRIP.AUTO-MCNC: NEGATIVE MG/DL
LDLC SERPL CALC-MCNC: 88 MG/DL
LEUKOCYTE ESTERASE UR QL STRIP.AUTO: NEGATIVE
LYMPHOCYTES # BLD AUTO: 0.79 X10*3/UL (ref 0.8–3)
LYMPHOCYTES NFR BLD AUTO: 21.3 %
MCH RBC QN AUTO: 26.6 PG (ref 26–34)
MCHC RBC AUTO-ENTMCNC: 31.7 G/DL (ref 32–36)
MCV RBC AUTO: 84 FL (ref 80–100)
MONOCYTES # BLD AUTO: 0.36 X10*3/UL (ref 0.05–0.8)
MONOCYTES NFR BLD AUTO: 9.7 %
NEUTROPHILS # BLD AUTO: 2.44 X10*3/UL (ref 1.6–5.5)
NEUTROPHILS NFR BLD AUTO: 65.7 %
NITRITE UR QL STRIP.AUTO: NEGATIVE
NON HDL CHOLESTEROL: 106 MG/DL (ref 0–149)
NRBC BLD-RTO: 0 /100 WBCS (ref 0–0)
PH UR STRIP.AUTO: 6 [PH]
PLATELET # BLD AUTO: 186 X10*3/UL (ref 150–450)
POTASSIUM SERPL-SCNC: 4.1 MMOL/L (ref 3.5–5.3)
PROT SERPL-MCNC: 6.5 G/DL (ref 6.4–8.2)
PROT UR STRIP.AUTO-MCNC: NEGATIVE MG/DL
RBC # BLD AUTO: 4.51 X10*6/UL (ref 4–5.2)
RBC # UR STRIP.AUTO: ABNORMAL /UL
RBC #/AREA URNS AUTO: ABNORMAL /HPF
SODIUM SERPL-SCNC: 141 MMOL/L (ref 136–145)
SP GR UR STRIP.AUTO: 1.01
TRIGL SERPL-MCNC: 91 MG/DL (ref 0–149)
TSH SERPL-ACNC: 2.49 MIU/L (ref 0.44–3.98)
UROBILINOGEN UR STRIP.AUTO-MCNC: NORMAL MG/DL
VLDL: 18 MG/DL (ref 0–40)
WBC # BLD AUTO: 3.7 X10*3/UL (ref 4.4–11.3)
WBC #/AREA URNS AUTO: ABNORMAL /HPF

## 2024-09-11 PROCEDURE — 85025 COMPLETE CBC W/AUTO DIFF WBC: CPT

## 2024-09-11 PROCEDURE — 84443 ASSAY THYROID STIM HORMONE: CPT

## 2024-09-11 PROCEDURE — 81001 URINALYSIS AUTO W/SCOPE: CPT

## 2024-09-11 PROCEDURE — 80061 LIPID PANEL: CPT

## 2024-09-11 PROCEDURE — 80053 COMPREHEN METABOLIC PANEL: CPT

## 2024-09-11 PROCEDURE — 36415 COLL VENOUS BLD VENIPUNCTURE: CPT

## 2024-09-11 PROCEDURE — 83036 HEMOGLOBIN GLYCOSYLATED A1C: CPT

## 2024-09-11 PROCEDURE — 86141 C-REACTIVE PROTEIN HS: CPT

## 2024-09-11 PROCEDURE — 82306 VITAMIN D 25 HYDROXY: CPT

## 2024-09-12 ENCOUNTER — TELEPHONE (OUTPATIENT)
Dept: PRIMARY CARE | Facility: CLINIC | Age: 84
End: 2024-09-12
Payer: MEDICARE

## 2024-09-12 NOTE — TELEPHONE ENCOUNTER
She got the covid vaccine on Monday prior to getting her labs  Her  was diagnosed with metastatic melanoma and in May he was diagnosed with AML.  She has been very stressed.  Advised her that the vaccine can cause these changes to the blood work.  We will repeat it and her cbc  in 4 weeks

## 2024-09-12 NOTE — TELEPHONE ENCOUNTER
Patient would like to discuss labs done for physical.  She is concerned about CRP results,  appointment scheduled 9/17    Available before 1230p or after 4pm,  is at [facility] for treatment.    Please call  [phone]

## 2024-09-15 PROBLEM — E78.00 PURE HYPERCHOLESTEROLEMIA: Status: RESOLVED | Noted: 2023-09-18 | Resolved: 2024-09-15

## 2024-09-15 RX ORDER — MIRTAZAPINE 30 MG/1
1 TABLET, FILM COATED ORAL
COMMUNITY
Start: 2024-06-26

## 2024-09-15 NOTE — PROGRESS NOTES
Physical Exam    Name Valerie Owusu    Date of Service :9/17/2024      Valerie Owusu is a 84 y.o. year old female who is being seen for a Medicare Wellness and [facility] Physical   Health Risk Assessment  In general, health is:  good     Medical History   -Asthmatic bronchitis (not a true asthmatic per patient) ,    -HTN,   -dyslipidemia, preclinical coronary artery disease with elevated  ,   -hx of PSVT since age 16 -  taking a metoprolol tartrate 25 mg every morning and prn and  metoprolol succinate 25 mg in the evening.   -osteoporosis ( on Prolia) ,    -longstanding insomnia, --psychiatry  Dr. Selin Valencia  -Anxious mood   -breast implants in both breasts.  She has an underdeveloped left breast with silicone implants that are now encapsulated and protruding.  She wished she had had them removed.  Approximately 11 years ago, she ended up in the intensive care unit after an implant replacement she got dehydrated from vomiting with severe hyponatremia to 116.     Microhematuria workup neg in the past w/CT urogram w/kidney cysts. Urine cytology neg, cystoscopy neg. Saw urology, neg investigation.      -chronic insomnia.  She has tried many different sleep aides.   Now takes CBC gummie .03 thc which helps.   Mirtazapine has been helpful as well as xanax .  Doxepin gave her tachycardia and she wishes to avoid this .      2018 normal stress echo,      Current exercise habits: walks regularly   Dietary issues discussed: Yes    Cardiac Risk Assessment  Cardiovascular risk was discussed and, if needed, lifestyle modifications recommended, including nutritional choices, exercise, and elimination of habits contributing to risk. We agreed on a plan to reduce the current cardiovascular risk based on above discussion as needed.  Aspirin use/disuse was discussed after reviewing the updated guidelines :    Hearing difficulties: No    Visual Acuity assessed: yes    In the past year have you fallen or had a near  "fall?:No    Activities of Daily Living  Needs help with grocery shopping, cooking, housework, bathing, grooming, dressing, eating, sitting or standing, walking, using the toilet, handling finances, taking medications, using the telephone, or driving:  No     Safe in current home environment: yes  Concerns with balance:  No     Following safety precautions in the home environment and vehicle: removed throw rugs from floors, installed grab bars in the bathroom, handrails in stairwells, having adequate lighting, wearing seatbelt at all times?:  Yes     Depression Screen  (Note: if answer to either of the following is \"Yes\", then a more complete depression screening is indicated)   Q1: Over the past two weeks, have you felt down, depressed or hopeless? No  Q2: Over the past two weeks, have you felt little interest or pleasure in doing things? No    Social History     Tobacco Use    Smoking status: Never    Smokeless tobacco: Never     Social History     Social History Narrative     to [facility] with 2 adult kids Dr. Micheal Owusu and Connie who are in town.  She has 5 biologic grandchildren and 2 grandchildren from Connie's second marriage as her 's children    She loves gardening.  [facility]   Worked in special education.        Current Providers  Specialists: I have reviewed specialist-related care of the patient in the medical record.    Opioid use review  Patient use of opioids:  None      Cognitive screening  Mini Cog Score:  5/5      Cognitive screening reviewed and plan:  not applicable      Functional Observation  Was the patient's timed Up & Go test unsteady or >= 12 seconds?  No      Advance Care Planning  End of Life planning discussed, including patient's advanced directive wishes:  Yes     ---------------      Medical/Family history review  Reviewed and updated problem list, medical/surgical/family/social history, medications, and allergies.    Patient Active Problem List   Diagnosis    Chronic " constipation    Coronary artery arteriosclerosis    Osteoporosis    Hyperlipidemia    Hypertension    IGT (impaired glucose tolerance)    Insomnia    PSVT (paroxysmal supraventricular tachycardia) (CMS-[facility])    Allergic rhinitis    Annual physical exam    Frequent PVCs        Past Medical History:   Diagnosis Date    Asthmatic bronchitis ([facility])     Hyperparathyroidism ([facility])     Other disorders of intestinal carbohydrate absorption     Posterior vitreous detachment of right eye     PSVT (paroxysmal supraventricular tachycardia) (CMS-[facility])     Rosacea, unspecified     Sciatica     Spinal stenosis of lumbar region         Past Surgical History:   Procedure Laterality Date    BREAST BIOPSY      BREAST SURGERY      Breast Surgery Reconstruction    PARATHYROIDECTOMY           Family History   Problem Relation Name Age of Onset    Coronary artery disease Mother          d 70    Pleurisy Mother      Stroke Mother      Heart failure Mother      Dementia Father  95        d.99    Coronary artery disease Father      Asthma Sister      Depression Sister      Multiple myeloma Son Micheal         physician    Lymphoma Father's Sister  40        d.40s    Breast cancer Paternal Grandmother  50 - 59    Suicidality Half-Sister      Alcohol abuse Half-Sister        Mom: - battered by her second .  Asthmatic , lung issues from pleurisy. Cva, chf, d. 70  Dad:  age 99.  Healthy   dementia at 95, cad.   Siblings: 1 biologic sister ( Mackenzie)- 4 years younger.  phD psychologist. Asthma, depression ( was sexually abused) , obesity.   1/2 sister- committed suicide at 40 , alcoholic   Mgm: lived to 100  Mgf:  age 62 heart issues   Pgm:  of cancer.  Had breast cancer  at age 50's   Pgf:  42 stomach cancer   Paternal aunt-  in her 40's lymphoma   Children:   Micheal- multiple myeloma , had stem cell transplant 3 years ago.   Connie- had birth trauma from twins with hernias         Medications and Supplements  prescribed by me  and other practitioners or clinical pharmacist (such as prescriptions, OTC's, herbal therapies and supplements) were reviewed and documented in the medical record.        Current Outpatient Medications:     ALPRAZolam (Xanax) 0.5 mg tablet, Take 1 tablet (0.5 mg) by mouth once daily as needed., Disp: , Rfl:     amLODIPine (Norvasc) 5 mg tablet, Take 1 tablet (5 mg) by mouth once daily., Disp: 90 tablet, Rfl: 3    aspirin 81 mg EC tablet, Take 1 tablet (81 mg) by mouth once daily., Disp: , Rfl:     cholecalciferol (Vitamin D-3) 50 mcg (2,000 unit) capsule, Take 2 capsules (100 mcg) by mouth once daily., Disp: , Rfl:     denosumab (Prolia) 60 mg/mL syringe, Inject under the skin., Disp: , Rfl:     ezetimibe (Zetia) 10 mg tablet, Take 1 tablet (10 mg) by mouth once daily., Disp: 90 tablet, Rfl: 3    magnesium oxide (Mag-Ox) 400 mg tablet, Take 1.25 tablets (500 mg) by mouth 1 (one) time per week., Disp: 11 tablet, Rfl: 11    metoprolol succinate XL (Toprol-XL) 25 mg 24 hr tablet, Take 1 tablet (25 mg) by mouth once daily., Disp: 90 tablet, Rfl: 3    mirtazapine (Remeron) 30 mg tablet, Take 1 tablet (30 mg) by mouth early in the morning.., Disp: , Rfl:     olmesartan (BENIcar) 40 mg tablet, Take 1 tablet (40 mg) by mouth once daily., Disp: 90 tablet, Rfl: 3    rosuvastatin (Crestor) 20 mg tablet, Take 1 tablet (20 mg) by mouth once daily at bedtime., Disp: 90 tablet, Rfl: 3    metoprolol tartrate (Lopressor) 25 mg tablet, Take 1 tablet (25 mg) by mouth once daily as needed (tachycardia)., Disp: 30 tablet, Rfl: 0    Allergies   Allergen Reactions    Codeine Unknown    Hydrochlorothiazide Other    Doxepin Palpitations       ROUTINE:     Immunizations current.   will get the flu in mid October     Mammogram: last completed has bilateral implants and has completed screening  Declines anymore screening   GYN EXAM: defers.    COLONOSCOPY:  last at 72. Cologuard at 80 neg.  Declines    DEXA: 8/12/24 lowest T score -1.4 in left  "femur   Dr. Milla Beckham  Next prolia in October     OPHTHALMOLOGY: 9/15/24  DERMATOLOGY current   DENTISTRY: current     No major health concerns today. She feels well  Occ. Her left hip hurts. She uses topical voltaren and tylenol as needed     Review of Systems     /74 (BP Location: Left arm, Patient Position: Sitting)   Pulse 60   Temp 36.8 °C (98.2 °F)   Ht 1.588 m (5' 2.5\")   Wt 65 kg (143 lb 6 oz)   SpO2 97%   BMI 25.81 kg/m²  Body mass index is 25.81 kg/m².    Physical Exam  Vitals reviewed.   Constitutional:       General: She is not in acute distress.     Appearance: Normal appearance.   HENT:      Right Ear: Tympanic membrane and external ear normal.      Left Ear: Tympanic membrane and external ear normal.      Ears:      Comments: Hearing intact bilaterally   Eyes:      Extraocular Movements: Extraocular movements intact.      Conjunctiva/sclera: Conjunctivae normal.   Neck:      Thyroid: No thyroid mass, thyromegaly or thyroid tenderness.      Vascular: No carotid bruit.   Cardiovascular:      Rate and Rhythm: Normal rate and regular rhythm.      Pulses: Normal pulses.      Heart sounds: Normal heart sounds. No murmur heard.     No gallop.   Pulmonary:      Effort: Pulmonary effort is normal. No respiratory distress.      Breath sounds: Normal breath sounds. No wheezing, rhonchi or rales.   Abdominal:      General: Bowel sounds are normal. There is no distension.      Palpations: There is no mass.      Tenderness: There is no abdominal tenderness. There is no guarding. Negative signs include psoas sign.   Musculoskeletal:         General: No swelling or tenderness. Normal range of motion.      Cervical back: Neck supple.      Right lower leg: No edema.      Left lower leg: No edema.   Lymphadenopathy:      Head:      Right side of head: No submandibular adenopathy.      Left side of head: No submandibular adenopathy.      Cervical: No cervical adenopathy.      Upper Body:      Right upper " body: No supraclavicular adenopathy.      Left upper body: No supraclavicular adenopathy.      Lower Body: No right inguinal adenopathy. No left inguinal adenopathy.   Skin:     General: Skin is warm and dry.      Findings: No rash.   Neurological:      General: No focal deficit present.      Mental Status: She is alert.      Motor: Motor function is intact.      Coordination: Coordination is intact.      Gait: Gait normal.   Psychiatric:         Mood and Affect: Mood normal.         Speech: Speech normal.         Thought Content: Thought content normal.           RESULTS/DATA:  Reviewed Standard Labs for this physical with patient ( any significant issues addressed in A/P )     Component      Latest Ref Rng 9/11/2024   WBC      4.4 - 11.3 x10*3/uL 3.7 (L)    nRBC      0.0 - 0.0 /100 WBCs 0.0    RBC      4.00 - 5.20 x10*6/uL 4.51    HEMOGLOBIN      12.0 - 16.0 g/dL 12.0    HEMATOCRIT      36.0 - 46.0 % 37.8    MCV      80 - 100 fL 84    MCH      26.0 - 34.0 pg 26.6    MCHC      32.0 - 36.0 g/dL 31.7 (L)    RED CELL DISTRIBUTION WIDTH      11.5 - 14.5 % 13.8    Platelets      150 - 450 x10*3/uL 186    Neutrophils %      40.0 - 80.0 % 65.7    Immature Granulocytes %, Automated      0.0 - 0.9 % 0.3    Lymphocytes %      13.0 - 44.0 % 21.3    Monocytes %      2.0 - 10.0 % 9.7    Eosinophils %      0.0 - 6.0 % 1.9    Basophils %      0.0 - 2.0 % 1.1    Neutrophils Absolute      1.60 - 5.50 x10*3/uL 2.44    Immature Granulocytes Absolute, Automated      0.00 - 0.50 x10*3/uL 0.01    Lymphocytes Absolute      0.80 - 3.00 x10*3/uL 0.79 (L)    Monocytes Absolute      0.05 - 0.80 x10*3/uL 0.36    Eosinophils Absolute      0.00 - 0.40 x10*3/uL 0.07    Basophils Absolute      0.00 - 0.10 x10*3/uL 0.04    GLUCOSE      74 - 99 mg/dL 113 (H)    SODIUM      136 - 145 mmol/L 141    POTASSIUM      3.5 - 5.3 mmol/L 4.1    CHLORIDE      98 - 107 mmol/L 105    Bicarbonate      21 - 32 mmol/L 27    Anion Gap      10 - 20 mmol/L 13     Blood Urea Nitrogen      6 - 23 mg/dL 10    Creatinine      0.50 - 1.05 mg/dL 0.58    EGFR      >60 mL/min/1.73m*2 90    Calcium      8.6 - 10.6 mg/dL 9.2    Albumin      3.4 - 5.0 g/dL 4.2    Alkaline Phosphatase      33 - 136 U/L 49    Total Protein      6.4 - 8.2 g/dL 6.5    AST      9 - 39 U/L 15    Bilirubin Total      0.0 - 1.2 mg/dL 0.5    ALT      7 - 45 U/L 20    Color, Urine      Light-Yellow, Yellow, Dark-Yellow  Light-Yellow    Appearance, Urine      Clear  Clear    Specific Gravity, Urine      1.005 - 1.035  1.009    pH, Urine      5.0, 5.5, 6.0, 6.5, 7.0, 7.5, 8.0  6.0    Protein, Urine      NEGATIVE, 10 (TRACE), 20 (TRACE) mg/dL NEGATIVE    Glucose, Urine      Normal mg/dL Normal    Blood, Urine      NEGATIVE  0.2 (2+) !    Ketones, Urine      NEGATIVE mg/dL NEGATIVE    Bilirubin, Urine      NEGATIVE  NEGATIVE    Urobilinogen, Urine      Normal mg/dL Normal    Nitrite, Urine      NEGATIVE  NEGATIVE    Leukocyte Esterase, Urine      NEGATIVE  NEGATIVE    CHOLESTEROL      0 - 199 mg/dL 184    HDL CHOLESTEROL      mg/dL 78.0    Cholesterol/HDL Ratio 2.4    LDL Calculated      <=99 mg/dL 88    VLDL      0 - 40 mg/dL 18    TRIGLYCERIDES      0 - 149 mg/dL 91    Non HDL Cholesterol      0 - 149 mg/dL 106    Hemoglobin A1C      see below % 5.9 (H)    Estimated Average Glucose      Not Established mg/dL 123    WBC, Urine      1-5, NONE /HPF NONE    RBC, Urine      NONE, 1-2, 3-5 /HPF 6-10 !    CRP, High Sensitivity      <1.0 mg/L 4.5 (H)    Thyroid Stimulating Hormone      0.44 - 3.98 mIU/L 2.49    Vitamin D, 25-Hydroxy, Total      30 - 100 ng/mL 29 (L)    Extra Tube Hold for add-ons.       Legend:  (L) Low  (H) High  ! Abnormal      ECG: Ectopic atrial rhythm. Normal ecg. Unchanged from ecg 9/2023      Assessment/Plan   1. Tachycardia  Stable on her current regimen of metoprolol succinate every morning and metoprolol tartrate as needed.  Her last Holter monitor last year did not reveal any atrial  fibrillation.  Her EKG is unchanged from last year as well.  - metoprolol tartrate (Lopressor) 25 mg tablet; Take 1 tablet (25 mg) by mouth once daily as needed (tachycardia).  Dispense: 30 tablet; Refill: 0  - ezetimibe (Zetia) 10 mg tablet; Take 1 tablet (10 mg) by mouth once daily.  Dispense: 90 tablet; Refill: 3  - rosuvastatin (Crestor) 20 mg tablet; Take 1 tablet (20 mg) by mouth once daily at bedtime.  Dispense: 90 tablet; Refill: 3  - metoprolol succinate XL (Toprol-XL) 25 mg 24 hr tablet; Take 1 tablet (25 mg) by mouth once daily.  Dispense: 90 tablet; Refill: 3  - olmesartan (BENIcar) 40 mg tablet; Take 1 tablet (40 mg) by mouth once daily.  Dispense: 90 tablet; Refill: 3  - amLODIPine (Norvasc) 5 mg tablet; Take 1 tablet (5 mg) by mouth once daily.  Dispense: 90 tablet; Refill: 3    2. Annual physical exam  Routine health screenings were discussed.  She no longer wishes to obtain screening mammograms or ultrasounds or pelvic exams.  Declines any more colon cancer screening.  Her advanced directives were discussed.  She wishes to be resuscitated and case of cardiac arrest however should she be in a position where she is no longer neurologically intact or requiring life-sustaining support she would wish to be DNR comfort care only  - ECG 12 lead (Clinic Performed)    3. Elevated high sensitivity C-reactive protein  Suspect secondary to timing of her recent COVID immunization  Will have her repeat her labs in 2 weeks  - CBC and Auto Differential; Future  - High sensitivity CRP; Future    4. Coronary artery arteriosclerosis  No chest pain shortness of breath or lightheadedness.  EKG is unchanged  - aspirin 81 mg EC tablet; Take 1 tablet (81 mg) by mouth once daily.  Dispense: 90 tablet; Refill: 3    5. Encounter for Medicare annual wellness exam      Return for routine follow-up in 6 months    Heike Hodges MD

## 2024-09-16 ASSESSMENT — PROMIS GLOBAL HEALTH SCALE
RATE_QUALITY_OF_LIFE: VERY GOOD
RATE_SOCIAL_SATISFACTION: EXCELLENT
RATE_MENTAL_HEALTH: VERY GOOD
RATE_PHYSICAL_HEALTH: VERY GOOD
EMOTIONAL_PROBLEMS: SOMETIMES
RATE_GENERAL_HEALTH: VERY GOOD
RATE_AVERAGE_PAIN: 2
CARRYOUT_SOCIAL_ACTIVITIES: EXCELLENT
CARRYOUT_PHYSICAL_ACTIVITIES: COMPLETELY

## 2024-09-17 ENCOUNTER — APPOINTMENT (OUTPATIENT)
Dept: PRIMARY CARE | Facility: CLINIC | Age: 84
End: 2024-09-17
Payer: MEDICARE

## 2024-09-17 VITALS
TEMPERATURE: 98.2 F | HEART RATE: 60 BPM | WEIGHT: 143.38 LBS | DIASTOLIC BLOOD PRESSURE: 74 MMHG | HEIGHT: 63 IN | OXYGEN SATURATION: 97 % | SYSTOLIC BLOOD PRESSURE: 128 MMHG | BODY MASS INDEX: 25.41 KG/M2

## 2024-09-17 DIAGNOSIS — Z00.00 ENCOUNTER FOR MEDICARE ANNUAL WELLNESS EXAM: Primary | ICD-10-CM

## 2024-09-17 DIAGNOSIS — R00.0 TACHYCARDIA: ICD-10-CM

## 2024-09-17 DIAGNOSIS — I25.10 CORONARY ARTERY ARTERIOSCLEROSIS: ICD-10-CM

## 2024-09-17 DIAGNOSIS — Z00.00 ANNUAL PHYSICAL EXAM: ICD-10-CM

## 2024-09-17 DIAGNOSIS — R79.82 ELEVATED HIGH SENSITIVITY C-REACTIVE PROTEIN: ICD-10-CM

## 2024-09-17 PROBLEM — Z86.79 H/O VENTRICULAR TACHYCARDIA: Status: ACTIVE | Noted: 2024-09-17

## 2024-09-17 PROCEDURE — 1159F MED LIST DOCD IN RCRD: CPT | Performed by: INTERNAL MEDICINE

## 2024-09-17 PROCEDURE — 3078F DIAST BP <80 MM HG: CPT | Performed by: INTERNAL MEDICINE

## 2024-09-17 PROCEDURE — 3074F SYST BP LT 130 MM HG: CPT | Performed by: INTERNAL MEDICINE

## 2024-09-17 PROCEDURE — G0439 PPPS, SUBSEQ VISIT: HCPCS | Performed by: INTERNAL MEDICINE

## 2024-09-17 PROCEDURE — UHSPHYS PR UH SELECT PHYSICAL: Performed by: INTERNAL MEDICINE

## 2024-09-17 PROCEDURE — 93000 ELECTROCARDIOGRAM COMPLETE: CPT | Performed by: INTERNAL MEDICINE

## 2024-09-17 RX ORDER — ASPIRIN 81 MG/1
81 TABLET ORAL DAILY
Qty: 90 TABLET | Refills: 3 | Status: SHIPPED | OUTPATIENT
Start: 2024-09-17 | End: 2025-09-17

## 2024-09-17 RX ORDER — ROSUVASTATIN CALCIUM 20 MG/1
20 TABLET, COATED ORAL NIGHTLY
Qty: 90 TABLET | Refills: 3 | Status: SHIPPED | OUTPATIENT
Start: 2024-09-17

## 2024-09-17 RX ORDER — OLMESARTAN MEDOXOMIL 40 MG/1
40 TABLET ORAL DAILY
Qty: 90 TABLET | Refills: 3 | Status: SHIPPED | OUTPATIENT
Start: 2024-09-17

## 2024-09-17 RX ORDER — AMLODIPINE BESYLATE 5 MG/1
5 TABLET ORAL DAILY
Qty: 90 TABLET | Refills: 3 | Status: SHIPPED | OUTPATIENT
Start: 2024-09-17

## 2024-09-17 RX ORDER — METOPROLOL TARTRATE 25 MG/1
25 TABLET, FILM COATED ORAL DAILY PRN
Qty: 30 TABLET | Refills: 0 | Status: SHIPPED | OUTPATIENT
Start: 2024-09-17 | End: 2025-09-17

## 2024-09-17 RX ORDER — EZETIMIBE 10 MG/1
10 TABLET ORAL DAILY
Qty: 90 TABLET | Refills: 3 | Status: SHIPPED | OUTPATIENT
Start: 2024-09-17

## 2024-09-17 RX ORDER — METOPROLOL SUCCINATE 25 MG/1
25 TABLET, EXTENDED RELEASE ORAL DAILY
Qty: 90 TABLET | Refills: 3 | Status: SHIPPED | OUTPATIENT
Start: 2024-09-17

## 2024-09-17 NOTE — PATIENT INSTRUCTIONS
Increase your vitamin D to 5000 units a day  Your prescriptions were refilled to the [facility] in [place]   As discussed your lab abnormalities are most likely related to the vaccine you received prior .  Please Repeat labs in 2 weeks  ( non fasting)     We discussed your Advance directives   Please forward a copy of your advance directives and healthcare power of  for your chart     Follow up in 6 months

## 2024-09-23 ENCOUNTER — LAB (OUTPATIENT)
Dept: LAB | Facility: LAB | Age: 84
End: 2024-09-23
Payer: MEDICARE

## 2024-09-23 DIAGNOSIS — R79.82 ELEVATED HIGH SENSITIVITY C-REACTIVE PROTEIN: ICD-10-CM

## 2024-09-23 LAB
BASOPHILS # BLD AUTO: 0.06 X10*3/UL (ref 0–0.1)
BASOPHILS NFR BLD AUTO: 1 %
CRP SERPL HS-MCNC: 0.3 MG/L
EOSINOPHIL # BLD AUTO: 0.04 X10*3/UL (ref 0–0.4)
EOSINOPHIL NFR BLD AUTO: 0.6 %
ERYTHROCYTE [DISTWIDTH] IN BLOOD BY AUTOMATED COUNT: 14.1 % (ref 11.5–14.5)
HCT VFR BLD AUTO: 39.3 % (ref 36–46)
HGB BLD-MCNC: 12.5 G/DL (ref 12–16)
IMM GRANULOCYTES # BLD AUTO: 0.03 X10*3/UL (ref 0–0.5)
IMM GRANULOCYTES NFR BLD AUTO: 0.5 % (ref 0–0.9)
LYMPHOCYTES # BLD AUTO: 1.8 X10*3/UL (ref 0.8–3)
LYMPHOCYTES NFR BLD AUTO: 28.8 %
MCH RBC QN AUTO: 27.3 PG (ref 26–34)
MCHC RBC AUTO-ENTMCNC: 31.8 G/DL (ref 32–36)
MCV RBC AUTO: 86 FL (ref 80–100)
MONOCYTES # BLD AUTO: 0.37 X10*3/UL (ref 0.05–0.8)
MONOCYTES NFR BLD AUTO: 5.9 %
NEUTROPHILS # BLD AUTO: 3.95 X10*3/UL (ref 1.6–5.5)
NEUTROPHILS NFR BLD AUTO: 63.2 %
NRBC BLD-RTO: 0 /100 WBCS (ref 0–0)
PLATELET # BLD AUTO: 252 X10*3/UL (ref 150–450)
RBC # BLD AUTO: 4.58 X10*6/UL (ref 4–5.2)
WBC # BLD AUTO: 6.3 X10*3/UL (ref 4.4–11.3)

## 2024-09-23 PROCEDURE — 86141 C-REACTIVE PROTEIN HS: CPT

## 2024-09-23 PROCEDURE — 36415 COLL VENOUS BLD VENIPUNCTURE: CPT

## 2024-09-23 PROCEDURE — 85025 COMPLETE CBC W/AUTO DIFF WBC: CPT

## 2024-09-25 ENCOUNTER — APPOINTMENT (OUTPATIENT)
Dept: RHEUMATOLOGY | Facility: CLINIC | Age: 84
End: 2024-09-25
Payer: MEDICARE

## 2024-09-25 ENCOUNTER — APPOINTMENT (OUTPATIENT)
Dept: GASTROENTEROLOGY | Facility: CLINIC | Age: 84
End: 2024-09-25
Payer: MEDICARE

## 2024-09-25 VITALS
TEMPERATURE: 98.2 F | BODY MASS INDEX: 25.87 KG/M2 | WEIGHT: 146 LBS | DIASTOLIC BLOOD PRESSURE: 71 MMHG | OXYGEN SATURATION: 96 % | HEIGHT: 63 IN | HEART RATE: 67 BPM | SYSTOLIC BLOOD PRESSURE: 115 MMHG

## 2024-09-25 DIAGNOSIS — M81.0 OSTEOPOROSIS, UNSPECIFIED OSTEOPOROSIS TYPE, UNSPECIFIED PATHOLOGICAL FRACTURE PRESENCE: Primary | ICD-10-CM

## 2024-09-25 PROCEDURE — 3074F SYST BP LT 130 MM HG: CPT | Performed by: STUDENT IN AN ORGANIZED HEALTH CARE EDUCATION/TRAINING PROGRAM

## 2024-09-25 PROCEDURE — 1159F MED LIST DOCD IN RCRD: CPT | Performed by: STUDENT IN AN ORGANIZED HEALTH CARE EDUCATION/TRAINING PROGRAM

## 2024-09-25 PROCEDURE — 1036F TOBACCO NON-USER: CPT | Performed by: STUDENT IN AN ORGANIZED HEALTH CARE EDUCATION/TRAINING PROGRAM

## 2024-09-25 PROCEDURE — 3078F DIAST BP <80 MM HG: CPT | Performed by: STUDENT IN AN ORGANIZED HEALTH CARE EDUCATION/TRAINING PROGRAM

## 2024-09-25 PROCEDURE — 99214 OFFICE O/P EST MOD 30 MIN: CPT | Performed by: STUDENT IN AN ORGANIZED HEALTH CARE EDUCATION/TRAINING PROGRAM

## 2024-09-25 NOTE — PROGRESS NOTES
I saw and evaluated the patient. I personally obtained the key and critical portions of the history and physical exam or was physically present for key and critical portions performed by the trainee. I reviewed the trainee's documentation and discussed the patient with the trainee. I agree with the trainee's medical decision making, as documented on the trainee's note.     Adalberto Bolanos MD

## 2024-09-25 NOTE — PROGRESS NOTES
"Subjective   Patient ID: Valerie Owusu is a 84 y.o. female who presents for Follow-up.  HPI    84 yr old with H/O hyperparathyroidism and osteoporosis S/P parathyroidectomy.      Medications:  -Prolia 60 mg s/c q6months since October 2021( between [place] and [place])     Interval hx:  Patient is overall doing well, no new illnesses, no fractures or falls, no recent or planned dental work, no SE from the prolia.   Taking Vitamin D.   No arthralgias, rashes, weight changes or worsening height loss.  Last DEXA August 2024 with improvement in BMD as mentioned below:  FINDINGS:  SPINE L1-L4  Bone Mineral Density: 1.143  T-Score -0.3  Z-Score 1.6  Bone Mineral Density change vs baseline:  16.4%  Bone Mineral Density change vs previous: 5.9%      LEFT FEMUR -TOTAL  Bone Mineral Density: 0.850  T-Score -1.3   Z-Score  0.9  Bone Mineral Density change vs baseline: -1.8%  Bone Mineral Density change vs previous: 1.6%      LEFT FEMUR -NECK  Bone Mineral Density: 0.842  T-Score -1.4  Z-Score 0.8  Bone Mineral Density change vs baseline: 2.3%  Bone Mineral Density change vs previous: 5.8%            Per previous note:  \"She was non- complaint with Fosamax. She took it on and off for 15 years.   She discontinued Fosamax 1 year ago.   She had a DXA in 2020 which revealed 10 percent BMD loss in the hip.   Started on Prolia, she has had 2 injections , last one in April 2022. due for next injection in October  no side effects from the injection or concerns about Prolia today.        Review of Systems  - Per [facility]    Objective   Physical Exam  General: AAOx3. Cooperative.   Head: normocephalic, atraumatic   Eyes: EOMI, conjunctiva clear, sclera white, anicteric   Ears: hearing intact   Nose: no deformity   Throat/Mouth: No oral deformities. Mucosa appear moist. No oral ulcers noted.   Neck/Lymph: FROM. trachea midline   Lungs: chest expansion symmetric Clear to auscultation bilaterally. No wheezing, rhonchi, stridor.   Heart: S1, S2, " RRR. No murmur, rub.   Abdomen: Soft, non-tender without masses   Neuro: CN II-XII grossly intact, no focal deficit   Skin: No rashes, ulcers or photosensitive areas   MSK: Upper Extremities:   Hand/Fingers: No redness, swelling, pain at DIP, PIP, or MCP joints, FROM grossly.   Wrists: no erythema, swelling, or pain at wrist, FROM grossly   Elbows: No swelling, pain, erythema on elbows, FROM grossly   Shoulders: no swelling, redness, tenderness at shoulders   Lower Extremities:   Hips: No obvious deformities. no joint tenderness, normal ROM grossly   Knees: No pain, deformities, swelling, rashes, warmth, normal ROM grossly   Ankles, feet: no deformities, swelling, ulceration, warmth, swelling, synovitis at ankle joints, normal ROM grossly.    Assessment/Plan   Diagnoses and all orders for this visit:  Osteoporosis, unspecified osteoporosis type, unspecified pathological fracture presence       84 yr old wiith H/O hyperparathyroidism and osteoporosis S/P parathyroidectomy.      Medications:  -Prolia 60 mg s/c q6months since October 2021( between [facility])     #Osteoporosis with no h/o fractures  -continue with the 6 monthly prolia injection( Recent DEXA in 2024 with improvement in BMD as mentioned above). Plan for next injection in October 2024.   -Xray of spine showed degenerative disc disease and scoliosis without any vertebral fractures (Height has been stable since 2017 based on chart measures)   -recent labs WNL  -Next DEXA in 2026  -No planned dental work  -Taking Vit D only given the history of hyperPTH and issues with hypercalcemia in the past  -continue with the floor exercises and minor weight lifting and stretching exercises  -Fall precautions reinforced     Follow up in 12 months    Case discussed with Dr. Luis Miguel Baker DO 09/25/24 10:19 AM

## 2024-09-26 DIAGNOSIS — M81.0 OSTEOPOROSIS, UNSPECIFIED OSTEOPOROSIS TYPE, UNSPECIFIED PATHOLOGICAL FRACTURE PRESENCE: Primary | ICD-10-CM

## 2024-09-26 RX ORDER — ALBUTEROL SULFATE 0.83 MG/ML
3 SOLUTION RESPIRATORY (INHALATION) AS NEEDED
OUTPATIENT
Start: 2024-10-04

## 2024-09-26 RX ORDER — FAMOTIDINE 10 MG/ML
20 INJECTION INTRAVENOUS ONCE AS NEEDED
OUTPATIENT
Start: 2024-10-04

## 2024-09-26 RX ORDER — EPINEPHRINE 0.3 MG/.3ML
0.3 INJECTION SUBCUTANEOUS EVERY 5 MIN PRN
OUTPATIENT
Start: 2024-10-04

## 2024-09-26 RX ORDER — DIPHENHYDRAMINE HYDROCHLORIDE 50 MG/ML
50 INJECTION INTRAMUSCULAR; INTRAVENOUS AS NEEDED
OUTPATIENT
Start: 2024-10-04

## 2024-09-26 NOTE — PROGRESS NOTES
Continuation of therapy - Prolia injection due in Oct. Last dose done in April 2024 in [place]    Update - patient prefers to get her Prolia injection done at the [facility] location instead. Updated therapy plan for [facility]. Pt has medicare A/B so she is approved for [facility]

## 2024-09-27 ENCOUNTER — DOCUMENTATION (OUTPATIENT)
Dept: INFUSION THERAPY | Facility: CLINIC | Age: 84
End: 2024-09-27
Payer: MEDICARE

## 2024-09-27 NOTE — PROGRESS NOTES
"CLINICAL CLEARANCE FOR OUTPATIENT INJECTION      Patient to be scheduled for Continuation of Prolia injections.    For Diagnosis: Osteoporosis    Labs..  Calcium drawn on:   Lab Results   Component Value Date    CALCIUM 9.2 09/11/2024    No results found for: \"CAION\"   Lab Results   Component Value Date    GLUCOSE 113 (H) 09/11/2024    CALCIUM 9.2 09/11/2024     09/11/2024    K 4.1 09/11/2024    CO2 27 09/11/2024     09/11/2024    BUN 10 09/11/2024    CREATININE 0.58 09/11/2024        Chemistry    Lab Results   Component Value Date/Time     09/11/2024 1032    K 4.1 09/11/2024 1032     09/11/2024 1032    CO2 27 09/11/2024 1032    BUN 10 09/11/2024 1032    CREATININE 0.58 09/11/2024 1032    Lab Results   Component Value Date/Time    CALCIUM 9.2 09/11/2024 1032    ALKPHOS 49 09/11/2024 1032    AST 15 09/11/2024 1032    ALT 20 09/11/2024 1032    BILITOT 0.5 09/11/2024 1032           Calcium >8.6? Yes   (CA must be >8.6mg/dl or ionized calcium WNL.  Hold / Contact provider if <8.6) (must be within 28 days of scheduled injection)    Lab Results   Component Value Date    CREATININE 0.58 09/11/2024        Crcl: 78.081  (Patients with a crcl <30 are at increased risk of hypocalcemia)      *Not a hard stop. If crcl < 30 pt to discuss supplementation with prescribing provider.    Calcium and Vitamin D supplement on medication list? VIT D ONLY    Current Outpatient Medications:     ALPRAZolam (Xanax) 0.5 mg tablet, Take 1 tablet (0.5 mg) by mouth once daily as needed., Disp: , Rfl:     amLODIPine (Norvasc) 5 mg tablet, Take 1 tablet (5 mg) by mouth once daily., Disp: 90 tablet, Rfl: 3    aspirin 81 mg EC tablet, Take 1 tablet (81 mg) by mouth once daily., Disp: 90 tablet, Rfl: 3    cholecalciferol (Vitamin D-3) 50 mcg (2,000 unit) capsule, Take 2 capsules (100 mcg) by mouth once daily., Disp: , Rfl:     denosumab (Prolia) 60 mg/mL syringe, Inject 60mg/mL under the skin once every 6 months, Disp: 1 mL, " Rfl: 1    ezetimibe (Zetia) 10 mg tablet, Take 1 tablet (10 mg) by mouth once daily., Disp: 90 tablet, Rfl: 3    magnesium oxide (Mag-Ox) 400 mg tablet, Take 1.25 tablets (500 mg) by mouth 1 (one) time per week., Disp: 11 tablet, Rfl: 11    metoprolol succinate XL (Toprol-XL) 25 mg 24 hr tablet, Take 1 tablet (25 mg) by mouth once daily., Disp: 90 tablet, Rfl: 3    metoprolol tartrate (Lopressor) 25 mg tablet, Take 1 tablet (25 mg) by mouth once daily as needed (tachycardia)., Disp: 30 tablet, Rfl: 0    mirtazapine (Remeron) 30 mg tablet, Take 1 tablet (30 mg) by mouth early in the morning.., Disp: , Rfl:     olmesartan (BENIcar) 40 mg tablet, Take 1 tablet (40 mg) by mouth once daily., Disp: 90 tablet, Rfl: 3    rosuvastatin (Crestor) 20 mg tablet, Take 1 tablet (20 mg) by mouth once daily at bedtime., Disp: 90 tablet, Rfl: 3   (if no nurse to encourage discussion of supplementation at visit)    No obvious recent dental work per chart review. Nurse to confirm no dental within past/next 4 weeks at encounter.    Urine Hcg test ordered prior to first injection?No (If female pt <60 years of age and with reproductive capability)    Last injection received: 4/2024 IN [place] (if continuation)    Due: 10/2024     Ok to schedule for prolia within 28 days of the calcium lab draw date listed above.

## 2024-10-07 ENCOUNTER — INFUSION (OUTPATIENT)
Dept: HEMATOLOGY/ONCOLOGY | Facility: HOSPITAL | Age: 84
End: 2024-10-07
Payer: MEDICARE

## 2024-10-07 VITALS
DIASTOLIC BLOOD PRESSURE: 57 MMHG | TEMPERATURE: 97.2 F | OXYGEN SATURATION: 100 % | BODY MASS INDEX: 25.16 KG/M2 | RESPIRATION RATE: 18 BRPM | WEIGHT: 139.77 LBS | SYSTOLIC BLOOD PRESSURE: 112 MMHG | HEART RATE: 73 BPM

## 2024-10-07 DIAGNOSIS — M81.0 OSTEOPOROSIS, UNSPECIFIED OSTEOPOROSIS TYPE, UNSPECIFIED PATHOLOGICAL FRACTURE PRESENCE: ICD-10-CM

## 2024-10-07 PROCEDURE — 96372 THER/PROPH/DIAG INJ SC/IM: CPT

## 2024-10-07 PROCEDURE — 2500000004 HC RX 250 GENERAL PHARMACY W/ HCPCS (ALT 636 FOR OP/ED): Mod: JZ | Performed by: INTERNAL MEDICINE

## 2024-10-07 RX ORDER — DIPHENHYDRAMINE HYDROCHLORIDE 50 MG/ML
50 INJECTION INTRAMUSCULAR; INTRAVENOUS AS NEEDED
OUTPATIENT
Start: 2025-04-05

## 2024-10-07 RX ORDER — ALBUTEROL SULFATE 0.83 MG/ML
3 SOLUTION RESPIRATORY (INHALATION) AS NEEDED
OUTPATIENT
Start: 2025-04-05

## 2024-10-07 RX ORDER — FAMOTIDINE 10 MG/ML
20 INJECTION INTRAVENOUS ONCE AS NEEDED
OUTPATIENT
Start: 2025-04-05

## 2024-10-07 RX ORDER — EPINEPHRINE 0.3 MG/.3ML
0.3 INJECTION SUBCUTANEOUS EVERY 5 MIN PRN
OUTPATIENT
Start: 2025-04-05

## 2024-10-23 ENCOUNTER — OFFICE VISIT (OUTPATIENT)
Dept: PRIMARY CARE | Facility: CLINIC | Age: 84
End: 2024-10-23
Payer: MEDICARE

## 2024-10-23 VITALS
DIASTOLIC BLOOD PRESSURE: 77 MMHG | OXYGEN SATURATION: 97 % | TEMPERATURE: 97.9 F | SYSTOLIC BLOOD PRESSURE: 133 MMHG | HEART RATE: 76 BPM

## 2024-10-23 DIAGNOSIS — M70.62 TROCHANTERIC BURSITIS OF LEFT HIP: Primary | ICD-10-CM

## 2024-10-23 PROCEDURE — 3078F DIAST BP <80 MM HG: CPT | Performed by: INTERNAL MEDICINE

## 2024-10-23 PROCEDURE — 1159F MED LIST DOCD IN RCRD: CPT | Performed by: INTERNAL MEDICINE

## 2024-10-23 PROCEDURE — 20610 DRAIN/INJ JOINT/BURSA W/O US: CPT | Performed by: INTERNAL MEDICINE

## 2024-10-23 PROCEDURE — 1157F ADVNC CARE PLAN IN RCRD: CPT | Performed by: INTERNAL MEDICINE

## 2024-10-23 PROCEDURE — 3075F SYST BP GE 130 - 139MM HG: CPT | Performed by: INTERNAL MEDICINE

## 2024-10-23 RX ORDER — LIDOCAINE HYDROCHLORIDE 10 MG/ML
6 INJECTION, SOLUTION INFILTRATION; PERINEURAL ONCE
Status: COMPLETED | OUTPATIENT
Start: 2024-10-23 | End: 2024-10-23

## 2024-10-23 RX ORDER — TRIAMCINOLONE ACETONIDE 40 MG/ML
40 INJECTION, SUSPENSION INTRA-ARTICULAR; INTRAMUSCULAR ONCE
Status: COMPLETED | OUTPATIENT
Start: 2024-10-23 | End: 2024-10-23

## 2024-10-23 NOTE — PROGRESS NOTES
Here for a trochanteric bursa injection on left side. Has had intermittent pain for the past 4 weeks despite topical voltaren. If she takes more than 1 advil she gets a nose bleed    Patient ID: Valerie Owusu is a 84 y.o. female.    Trocanteric Bursa Injection     Date/Time: 10/23/2024 1:56 PM    Performed by: Heike Hodges MD  Authorized by: Heike Hodges MD    Consent:     Consent obtained:  Verbal    Consent given by:  Patient    Risks, benefits, and alternatives were discussed: yes      Procedural risks discussed: infection.    Alternatives discussed:  Alternative treatment  Location:     Location:  Lower extremity    Lower extremity location: left greater trocanter bursa.  Post-procedure details:     Post-removal:  Antibiotic ointment applied    Procedure completion:  Tolerated    Procedure Note:  left trochanteric bursa injection:   Area prepped with betadine and washed with alcohol  2 ml of 1 % xlocaine used to numb the overlying skin with 22 G  With an 22 G needle 1 ml of 40 mg / ml of kenalog and 4 ml of 1% xylocaine injected in bursa space.   Area covered with bacitracin and a compression dressing applied  Patient tolerated procedure well.

## 2025-02-18 DIAGNOSIS — R00.0 TACHYCARDIA: ICD-10-CM

## 2025-02-18 RX ORDER — AMLODIPINE BESYLATE 5 MG/1
5 TABLET ORAL DAILY
Qty: 90 TABLET | Refills: 3 | Status: SHIPPED | OUTPATIENT
Start: 2025-02-18

## 2025-02-20 ENCOUNTER — TELEPHONE (OUTPATIENT)
Dept: PRIMARY CARE | Facility: CLINIC | Age: 85
End: 2025-02-20
Payer: MEDICARE

## 2025-02-20 ENCOUNTER — DOCUMENTATION (OUTPATIENT)
Dept: PULMONOLOGY | Facility: HOSPITAL | Age: 85
End: 2025-02-20
Payer: MEDICARE

## 2025-02-20 DIAGNOSIS — R05.8 RESPIRATORY TRACT CONGESTION WITH COUGH: Primary | ICD-10-CM

## 2025-02-20 RX ORDER — AZITHROMYCIN 250 MG/1
TABLET, FILM COATED ORAL
Qty: 6 TABLET | Refills: 0 | Status: SHIPPED | OUTPATIENT
Start: 2025-02-20 | End: 2025-02-25

## 2025-02-20 NOTE — PROGRESS NOTES
Patient has a URI symptom complex with sore throat and swollen glands.  Her course is confounded by the fact that her  is in the hospital with acute leukemia and melanoma and not doing well and her son has a relapse of his myeloma.  I called her to seer how her  and son are; She asked if I could call in an antibiotic, waiting to hear back from her primary physicians.  She will let them know that I called in azithromycin for her.  This is what she usually gets.

## 2025-02-20 NOTE — TELEPHONE ENCOUNTER
Sorry to read this   Would certainly just do symptomatic care with use of Tylenol for aches and pains including her throat -may get some relief with use of lozenges like Sucrets and/or honey (teaspoon 3 times a day or mixed in tea) and also may try Throat Coat Tea that can get at [facility] - I certainly would suggest she get a COVID test before she goes anywhere and if negative would still  wear a mask when visiting the hospitals    Hope this helps!      Thanks!

## 2025-02-20 NOTE — TELEPHONE ENCOUNTER
Patient has been traveling due to  admitted to  [facility]  acute Myeloma Leukemia and son at [facility].  She developed cold symptoms yesterday.  Sx-swollen glands, hard to swallow pills, hoarseness, no fever, no mucus, cough not as bad. She did not Covid test herself as of this morning    Due to  in hospitals, she would like to get some recommendations to go to see him.    Please advise   [phone]  I can call her back

## 2025-02-28 ENCOUNTER — OFFICE VISIT (OUTPATIENT)
Dept: PRIMARY CARE | Facility: CLINIC | Age: 85
End: 2025-02-28
Payer: MEDICARE

## 2025-02-28 VITALS
SYSTOLIC BLOOD PRESSURE: 128 MMHG | OXYGEN SATURATION: 96 % | DIASTOLIC BLOOD PRESSURE: 77 MMHG | TEMPERATURE: 98.5 F | HEART RATE: 72 BPM

## 2025-02-28 DIAGNOSIS — R00.0 TACHYCARDIA: Primary | ICD-10-CM

## 2025-02-28 RX ORDER — METOPROLOL SUCCINATE 50 MG/1
50 TABLET, EXTENDED RELEASE ORAL DAILY
Qty: 90 TABLET | Refills: 3 | Status: SHIPPED | OUTPATIENT
Start: 2025-02-28 | End: 2026-02-28

## 2025-02-28 NOTE — PROGRESS NOTES
Subjective   Patient ID: Valerie Owusu is a 84 y.o. female who presents for Follow-up.    HPI   Here for discussion of her increasing episodes of tachycardia over the past 8 months.  She has known tachycardia for many years.  Her last Holter monitor was 1-1/2 years ago without any evidence of A-fib at 13% burden of SVT  She has been taking metoprolol succinate 25 mg daily with a 25 mg tartrate as needed.  She states she usually can control her tachycardia but lately it has not helped.  In the past she would go many months without any episodes and then a few days with increased episodes however the past 8 months it has been frequently in the rates to 147 to the 150s.  She has been under increased stress with her 's relapse of his acute myeloid leukemia in her son's relapse of multiple myeloma.  She has not been sleeping as well as in the past.  She does see a psychiatrist to had increased her mirtazapine to 45 mg and prescribed Xanax 0.5 mg.  She cut back the mirtazapine back to 30 mg and was afraid to use the Xanax.  Not lightheaded, no sob, not dizziness, no cp    Review of Systems    Objective   /77 (BP Location: Left arm, Patient Position: Sitting)   Pulse 72   Temp 36.9 °C (98.5 °F)   SpO2 96%     Physical Exam  Constitutional:       Appearance: Normal appearance.   Cardiovascular:      Rate and Rhythm: Normal rate and regular rhythm.      Comments: Extras beats heard irregularly superimposed on a regular rhythm   Pulmonary:      Effort: Pulmonary effort is normal.      Breath sounds: Normal breath sounds.   Skin:     General: Skin is warm and dry.   Neurological:      General: No focal deficit present.       EKG-  SVT at rate of 143, regular     Assessment/Plan     Known tachycardia with exacerbation over the past 8 months which correlates with her increased stress from her  and her sons relapse of their cancers.  Her last Holter monitor was 1-1/2 years ago without any atrial  fibrillation.  Will increase her metoprolol XR to 50 mg and have her try to take it at night to see if this will also help with her sleep issues.  Encouraged her to use the Xanax as needed that her psychiatrist had prescribed and especially if she is gone a few nights with poor sleep.    Problem List Items Addressed This Visit    None  Visit Diagnoses         Codes    Tachycardia    -  Primary R00.0    Relevant Medications    metoprolol succinate XL (Toprol-XL) 50 mg 24 hr tablet    Other Relevant Orders    ECG 12 lead (Clinic Performed)

## 2025-02-28 NOTE — PATIENT INSTRUCTIONS
Will increase your Metoprolol succinate 50 mg . Try taking it at night at bedtime to see if you sleep better.    Still okay to take the Metoprolol tartrate 25 mg as needed  for tachycardia    If you have had a few nights of poor sleep please feel free to use the xanax   Sleep deprivation can increase your tachycardia     Call or return for any questions or concerns   See you after September 17 th for your physical

## 2025-03-26 DIAGNOSIS — M81.0 OSTEOPOROSIS, UNSPECIFIED OSTEOPOROSIS TYPE, UNSPECIFIED PATHOLOGICAL FRACTURE PRESENCE: ICD-10-CM

## 2025-03-28 ENCOUNTER — APPOINTMENT (OUTPATIENT)
Dept: LAB | Facility: HOSPITAL | Age: 85
End: 2025-03-28
Payer: MEDICARE

## 2025-03-28 LAB
ALBUMIN SERPL BCP-MCNC: 4.4 G/DL (ref 3.4–5)
ALP SERPL-CCNC: 48 U/L (ref 33–136)
ALT SERPL W P-5'-P-CCNC: 42 U/L (ref 7–45)
ANION GAP SERPL CALC-SCNC: 13 MMOL/L (ref 10–20)
AST SERPL W P-5'-P-CCNC: 28 U/L (ref 9–39)
BILIRUB SERPL-MCNC: 0.4 MG/DL (ref 0–1.2)
BUN SERPL-MCNC: 20 MG/DL (ref 6–23)
CALCIUM SERPL-MCNC: 9.8 MG/DL (ref 8.6–10.3)
CHLORIDE SERPL-SCNC: 104 MMOL/L (ref 98–107)
CO2 SERPL-SCNC: 29 MMOL/L (ref 21–32)
CREAT SERPL-MCNC: 0.76 MG/DL (ref 0.5–1.05)
EGFRCR SERPLBLD CKD-EPI 2021: 78 ML/MIN/1.73M*2
GLUCOSE SERPL-MCNC: 127 MG/DL (ref 74–99)
POTASSIUM SERPL-SCNC: 4.8 MMOL/L (ref 3.5–5.3)
PROT SERPL-MCNC: 7 G/DL (ref 6.4–8.2)
SODIUM SERPL-SCNC: 141 MMOL/L (ref 136–145)

## 2025-03-28 PROCEDURE — 80053 COMPREHEN METABOLIC PANEL: CPT | Performed by: INTERNAL MEDICINE

## 2025-03-28 PROCEDURE — 36415 COLL VENOUS BLD VENIPUNCTURE: CPT

## 2025-04-02 ENCOUNTER — APPOINTMENT (OUTPATIENT)
Dept: HEMATOLOGY/ONCOLOGY | Facility: HOSPITAL | Age: 85
End: 2025-04-02
Payer: MEDICARE

## 2025-04-04 ENCOUNTER — INFUSION (OUTPATIENT)
Dept: HEMATOLOGY/ONCOLOGY | Facility: HOSPITAL | Age: 85
End: 2025-04-04
Payer: MEDICARE

## 2025-04-04 VITALS
RESPIRATION RATE: 16 BRPM | WEIGHT: 146.16 LBS | HEART RATE: 72 BPM | OXYGEN SATURATION: 100 % | BODY MASS INDEX: 26.31 KG/M2 | TEMPERATURE: 96.4 F | DIASTOLIC BLOOD PRESSURE: 54 MMHG | SYSTOLIC BLOOD PRESSURE: 110 MMHG

## 2025-04-04 DIAGNOSIS — M81.0 OSTEOPOROSIS, UNSPECIFIED OSTEOPOROSIS TYPE, UNSPECIFIED PATHOLOGICAL FRACTURE PRESENCE: ICD-10-CM

## 2025-04-04 PROCEDURE — 2500000004 HC RX 250 GENERAL PHARMACY W/ HCPCS (ALT 636 FOR OP/ED): Mod: JZ,TB | Performed by: INTERNAL MEDICINE

## 2025-04-04 PROCEDURE — 96372 THER/PROPH/DIAG INJ SC/IM: CPT

## 2025-04-04 RX ORDER — FAMOTIDINE 10 MG/ML
20 INJECTION, SOLUTION INTRAVENOUS ONCE AS NEEDED
Status: DISCONTINUED | OUTPATIENT
Start: 2025-04-04 | End: 2025-04-04 | Stop reason: HOSPADM

## 2025-04-04 RX ORDER — ALBUTEROL SULFATE 0.83 MG/ML
3 SOLUTION RESPIRATORY (INHALATION) AS NEEDED
OUTPATIENT
Start: 2025-04-05

## 2025-04-04 RX ORDER — EPINEPHRINE 0.3 MG/.3ML
0.3 INJECTION SUBCUTANEOUS EVERY 5 MIN PRN
Status: DISCONTINUED | OUTPATIENT
Start: 2025-04-04 | End: 2025-04-04 | Stop reason: HOSPADM

## 2025-04-04 RX ORDER — EPINEPHRINE 0.3 MG/.3ML
0.3 INJECTION SUBCUTANEOUS EVERY 5 MIN PRN
OUTPATIENT
Start: 2025-04-05

## 2025-04-04 RX ORDER — FAMOTIDINE 10 MG/ML
20 INJECTION, SOLUTION INTRAVENOUS ONCE AS NEEDED
OUTPATIENT
Start: 2025-04-05

## 2025-04-04 RX ORDER — DIPHENHYDRAMINE HYDROCHLORIDE 50 MG/ML
50 INJECTION, SOLUTION INTRAMUSCULAR; INTRAVENOUS AS NEEDED
Status: DISCONTINUED | OUTPATIENT
Start: 2025-04-04 | End: 2025-04-04 | Stop reason: HOSPADM

## 2025-04-04 RX ORDER — DIPHENHYDRAMINE HYDROCHLORIDE 50 MG/ML
50 INJECTION, SOLUTION INTRAMUSCULAR; INTRAVENOUS AS NEEDED
OUTPATIENT
Start: 2025-04-05

## 2025-04-04 RX ORDER — ALBUTEROL SULFATE 0.83 MG/ML
3 SOLUTION RESPIRATORY (INHALATION) AS NEEDED
Status: DISCONTINUED | OUTPATIENT
Start: 2025-04-04 | End: 2025-04-04 | Stop reason: HOSPADM

## 2025-04-04 RX ADMIN — DENOSUMAB 60 MG: 60 INJECTION SUBCUTANEOUS at 12:44

## 2025-04-04 ASSESSMENT — PAIN SCALES - GENERAL: PAINLEVEL_OUTOF10: 0-NO PAIN

## 2025-04-04 NOTE — PROGRESS NOTES
Patient in for Denosumab injection , patient stated felling ok and tolerated the treatment well . Discharge patient ambulatory in a stable condition .

## 2025-07-25 DIAGNOSIS — Z00.00 HEALTH MAINTENANCE EXAMINATION: ICD-10-CM

## 2025-08-17 ENCOUNTER — OFFICE VISIT (OUTPATIENT)
Dept: URGENT CARE | Age: 85
End: 2025-08-17
Payer: MEDICARE

## 2025-08-17 VITALS
SYSTOLIC BLOOD PRESSURE: 127 MMHG | TEMPERATURE: 97.6 F | DIASTOLIC BLOOD PRESSURE: 64 MMHG | OXYGEN SATURATION: 98 % | BODY MASS INDEX: 25.03 KG/M2 | WEIGHT: 136 LBS | HEART RATE: 61 BPM | RESPIRATION RATE: 18 BRPM | HEIGHT: 62 IN

## 2025-08-17 DIAGNOSIS — Z23 NEED FOR TETANUS BOOSTER: ICD-10-CM

## 2025-08-17 DIAGNOSIS — S81.811A NONINFECTED SKIN TEAR OF LOWER EXTREMITY, RIGHT, INITIAL ENCOUNTER: Primary | ICD-10-CM

## 2025-08-17 PROCEDURE — 90471 IMMUNIZATION ADMIN: CPT | Performed by: STUDENT IN AN ORGANIZED HEALTH CARE EDUCATION/TRAINING PROGRAM

## 2025-08-17 PROCEDURE — 3078F DIAST BP <80 MM HG: CPT | Performed by: STUDENT IN AN ORGANIZED HEALTH CARE EDUCATION/TRAINING PROGRAM

## 2025-08-17 PROCEDURE — 3074F SYST BP LT 130 MM HG: CPT | Performed by: STUDENT IN AN ORGANIZED HEALTH CARE EDUCATION/TRAINING PROGRAM

## 2025-08-17 PROCEDURE — 1036F TOBACCO NON-USER: CPT | Performed by: STUDENT IN AN ORGANIZED HEALTH CARE EDUCATION/TRAINING PROGRAM

## 2025-08-17 PROCEDURE — 99203 OFFICE O/P NEW LOW 30 MIN: CPT | Performed by: STUDENT IN AN ORGANIZED HEALTH CARE EDUCATION/TRAINING PROGRAM

## 2025-08-17 PROCEDURE — 90715 TDAP VACCINE 7 YRS/> IM: CPT | Performed by: STUDENT IN AN ORGANIZED HEALTH CARE EDUCATION/TRAINING PROGRAM

## 2025-08-17 PROCEDURE — 1159F MED LIST DOCD IN RCRD: CPT | Performed by: STUDENT IN AN ORGANIZED HEALTH CARE EDUCATION/TRAINING PROGRAM

## 2025-08-17 ASSESSMENT — PATIENT HEALTH QUESTIONNAIRE - PHQ9
SUM OF ALL RESPONSES TO PHQ9 QUESTIONS 1 AND 2: 0
2. FEELING DOWN, DEPRESSED OR HOPELESS: NOT AT ALL
1. LITTLE INTEREST OR PLEASURE IN DOING THINGS: NOT AT ALL

## 2025-08-17 ASSESSMENT — ENCOUNTER SYMPTOMS
OCCASIONAL FEELINGS OF UNSTEADINESS: 0
LOSS OF SENSATION IN FEET: 0
DEPRESSION: 0
WOUND: 1

## 2025-08-24 DIAGNOSIS — R00.0 TACHYCARDIA: ICD-10-CM

## 2025-08-24 RX ORDER — ROSUVASTATIN CALCIUM 20 MG/1
20 TABLET, COATED ORAL NIGHTLY
Qty: 90 TABLET | Refills: 1 | Status: SHIPPED | OUTPATIENT
Start: 2025-08-24

## 2025-09-05 ENCOUNTER — TELEPHONE (OUTPATIENT)
Dept: PRIMARY CARE | Facility: CLINIC | Age: 85
End: 2025-09-05
Payer: MEDICARE

## 2025-09-05 DIAGNOSIS — I34.81 MITRAL VALVE ANNULAR CALCIFICATION: Primary | ICD-10-CM

## 2025-09-25 ENCOUNTER — APPOINTMENT (OUTPATIENT)
Dept: PRIMARY CARE | Facility: CLINIC | Age: 85
End: 2025-09-25
Payer: MEDICARE

## 2025-10-07 ENCOUNTER — APPOINTMENT (OUTPATIENT)
Dept: INFUSION THERAPY | Facility: CLINIC | Age: 85
End: 2025-10-07
Payer: MEDICARE